# Patient Record
Sex: MALE | Race: WHITE | NOT HISPANIC OR LATINO | Employment: OTHER | URBAN - METROPOLITAN AREA
[De-identification: names, ages, dates, MRNs, and addresses within clinical notes are randomized per-mention and may not be internally consistent; named-entity substitution may affect disease eponyms.]

---

## 2017-09-18 ENCOUNTER — OFFICE VISIT - HEALTHEAST (OUTPATIENT)
Dept: INTERNAL MEDICINE | Facility: CLINIC | Age: 72
End: 2017-09-18

## 2017-09-18 DIAGNOSIS — Z00.00 HEALTH CARE MAINTENANCE: ICD-10-CM

## 2017-09-18 DIAGNOSIS — Z12.5 PROSTATE CANCER SCREENING: ICD-10-CM

## 2017-09-18 LAB
CHOLEST SERPL-MCNC: 225 MG/DL
FASTING STATUS PATIENT QL REPORTED: YES
HDLC SERPL-MCNC: 52 MG/DL
LDLC SERPL CALC-MCNC: 162 MG/DL
PSA SERPL-MCNC: 3 NG/ML (ref 0–6.5)
TRIGL SERPL-MCNC: 53 MG/DL

## 2017-09-18 ASSESSMENT — MIFFLIN-ST. JEOR: SCORE: 1484.93

## 2017-09-21 ENCOUNTER — COMMUNICATION - HEALTHEAST (OUTPATIENT)
Dept: INTERNAL MEDICINE | Facility: CLINIC | Age: 72
End: 2017-09-21

## 2017-10-25 ENCOUNTER — RECORDS - HEALTHEAST (OUTPATIENT)
Dept: ADMINISTRATIVE | Facility: OTHER | Age: 72
End: 2017-10-25

## 2017-11-02 ENCOUNTER — RECORDS - HEALTHEAST (OUTPATIENT)
Dept: ADMINISTRATIVE | Facility: OTHER | Age: 72
End: 2017-11-02

## 2019-01-25 ENCOUNTER — OFFICE VISIT - HEALTHEAST (OUTPATIENT)
Dept: INTERNAL MEDICINE | Facility: CLINIC | Age: 74
End: 2019-01-25

## 2019-01-25 DIAGNOSIS — Z23 FLU VACCINE NEED: ICD-10-CM

## 2019-01-25 DIAGNOSIS — I10 ESSENTIAL HYPERTENSION, BENIGN: ICD-10-CM

## 2019-01-25 DIAGNOSIS — Z12.5 SCREENING FOR PROSTATE CANCER: ICD-10-CM

## 2019-01-25 DIAGNOSIS — E55.9 VITAMIN D DEFICIENCY: ICD-10-CM

## 2019-01-25 DIAGNOSIS — Z00.00 HEALTHCARE MAINTENANCE: ICD-10-CM

## 2019-01-25 DIAGNOSIS — Z12.11 SCREEN FOR COLON CANCER: ICD-10-CM

## 2019-01-25 DIAGNOSIS — E78.00 HYPERCHOLESTEROLEMIA: ICD-10-CM

## 2019-01-25 DIAGNOSIS — Z00.00 MEDICARE ANNUAL WELLNESS VISIT, SUBSEQUENT: ICD-10-CM

## 2019-01-25 DIAGNOSIS — C44.310 BASAL CELL CARCINOMA (BCC) OF SKIN OF FACE, UNSPECIFIED PART OF FACE: ICD-10-CM

## 2019-01-25 ASSESSMENT — MIFFLIN-ST. JEOR: SCORE: 1510.44

## 2019-01-28 ENCOUNTER — AMBULATORY - HEALTHEAST (OUTPATIENT)
Dept: LAB | Facility: CLINIC | Age: 74
End: 2019-01-28

## 2019-01-28 DIAGNOSIS — Z00.00 HEALTHCARE MAINTENANCE: ICD-10-CM

## 2019-01-28 DIAGNOSIS — Z12.5 SCREENING FOR PROSTATE CANCER: ICD-10-CM

## 2019-01-28 DIAGNOSIS — E55.9 VITAMIN D DEFICIENCY: ICD-10-CM

## 2019-01-28 DIAGNOSIS — E78.00 HYPERCHOLESTEROLEMIA: ICD-10-CM

## 2019-01-28 LAB
ALBUMIN SERPL-MCNC: 3.7 G/DL (ref 3.5–5)
ALBUMIN UR-MCNC: NEGATIVE MG/DL
ALP SERPL-CCNC: 46 U/L (ref 45–120)
ALT SERPL W P-5'-P-CCNC: 16 U/L (ref 0–45)
ANION GAP SERPL CALCULATED.3IONS-SCNC: 7 MMOL/L (ref 5–18)
APPEARANCE UR: CLEAR
AST SERPL W P-5'-P-CCNC: 15 U/L (ref 0–40)
BACTERIA #/AREA URNS HPF: NORMAL HPF
BILIRUB SERPL-MCNC: 0.7 MG/DL (ref 0–1)
BILIRUB UR QL STRIP: NEGATIVE
BUN SERPL-MCNC: 17 MG/DL (ref 8–28)
CALCIUM SERPL-MCNC: 8.9 MG/DL (ref 8.5–10.5)
CHLORIDE BLD-SCNC: 106 MMOL/L (ref 98–107)
CHOLEST SERPL-MCNC: 204 MG/DL
CO2 SERPL-SCNC: 27 MMOL/L (ref 22–31)
COLOR UR AUTO: YELLOW
CREAT SERPL-MCNC: 0.79 MG/DL (ref 0.7–1.3)
ERYTHROCYTE [DISTWIDTH] IN BLOOD BY AUTOMATED COUNT: 10.6 % (ref 11–14.5)
FASTING STATUS PATIENT QL REPORTED: YES
GFR SERPL CREATININE-BSD FRML MDRD: >60 ML/MIN/1.73M2
GLUCOSE BLD-MCNC: 93 MG/DL (ref 70–125)
GLUCOSE UR STRIP-MCNC: NEGATIVE MG/DL
HCT VFR BLD AUTO: 43.9 % (ref 40–54)
HDLC SERPL-MCNC: 46 MG/DL
HGB BLD-MCNC: 14.8 G/DL (ref 14–18)
HGB UR QL STRIP: NEGATIVE
KETONES UR STRIP-MCNC: NEGATIVE MG/DL
LDLC SERPL CALC-MCNC: 143 MG/DL
LEUKOCYTE ESTERASE UR QL STRIP: NEGATIVE
MCH RBC QN AUTO: 32 PG (ref 27–34)
MCHC RBC AUTO-ENTMCNC: 33.7 G/DL (ref 32–36)
MCV RBC AUTO: 95 FL (ref 80–100)
NITRATE UR QL: NEGATIVE
PH UR STRIP: 5.5 [PH] (ref 5–8)
PLATELET # BLD AUTO: 190 THOU/UL (ref 140–440)
PMV BLD AUTO: 7.3 FL (ref 7–10)
POTASSIUM BLD-SCNC: 4 MMOL/L (ref 3.5–5)
PROT SERPL-MCNC: 6.4 G/DL (ref 6–8)
PSA SERPL-MCNC: 2.4 NG/ML (ref 0–6.5)
RBC # BLD AUTO: 4.63 MILL/UL (ref 4.4–6.2)
RBC #/AREA URNS AUTO: NORMAL HPF
SODIUM SERPL-SCNC: 140 MMOL/L (ref 136–145)
SP GR UR STRIP: >=1.03 (ref 1–1.03)
SQUAMOUS #/AREA URNS AUTO: NORMAL LPF
TRIGL SERPL-MCNC: 74 MG/DL
UROBILINOGEN UR STRIP-ACNC: NORMAL
WBC #/AREA URNS AUTO: NORMAL HPF
WBC: 4.6 THOU/UL (ref 4–11)

## 2019-01-29 LAB — 25(OH)D3 SERPL-MCNC: 35.6 NG/ML (ref 30–80)

## 2019-02-07 ENCOUNTER — COMMUNICATION - HEALTHEAST (OUTPATIENT)
Dept: INTERNAL MEDICINE | Facility: CLINIC | Age: 74
End: 2019-02-07

## 2019-02-08 ENCOUNTER — COMMUNICATION - HEALTHEAST (OUTPATIENT)
Dept: INTERNAL MEDICINE | Facility: CLINIC | Age: 74
End: 2019-02-08

## 2019-02-08 DIAGNOSIS — I10 ESSENTIAL HYPERTENSION, BENIGN: ICD-10-CM

## 2019-03-11 ENCOUNTER — RECORDS - HEALTHEAST (OUTPATIENT)
Dept: ADMINISTRATIVE | Facility: OTHER | Age: 74
End: 2019-03-11

## 2019-03-13 ENCOUNTER — RECORDS - HEALTHEAST (OUTPATIENT)
Dept: ADMINISTRATIVE | Facility: OTHER | Age: 74
End: 2019-03-13

## 2019-03-14 ENCOUNTER — COMMUNICATION - HEALTHEAST (OUTPATIENT)
Dept: INTERNAL MEDICINE | Facility: CLINIC | Age: 74
End: 2019-03-14

## 2019-03-14 DIAGNOSIS — I10 ESSENTIAL HYPERTENSION, BENIGN: ICD-10-CM

## 2019-04-01 ENCOUNTER — OFFICE VISIT - HEALTHEAST (OUTPATIENT)
Dept: INTERNAL MEDICINE | Facility: CLINIC | Age: 74
End: 2019-04-01

## 2019-04-01 DIAGNOSIS — Z51.81 MEDICATION MONITORING ENCOUNTER: ICD-10-CM

## 2019-04-01 DIAGNOSIS — I10 ESSENTIAL HYPERTENSION: ICD-10-CM

## 2019-04-01 LAB
ANION GAP SERPL CALCULATED.3IONS-SCNC: 7 MMOL/L (ref 5–18)
BUN SERPL-MCNC: 24 MG/DL (ref 8–28)
CALCIUM SERPL-MCNC: 9.1 MG/DL (ref 8.5–10.5)
CHLORIDE BLD-SCNC: 106 MMOL/L (ref 98–107)
CO2 SERPL-SCNC: 27 MMOL/L (ref 22–31)
CREAT SERPL-MCNC: 0.78 MG/DL (ref 0.7–1.3)
GFR SERPL CREATININE-BSD FRML MDRD: >60 ML/MIN/1.73M2
GLUCOSE BLD-MCNC: 90 MG/DL (ref 70–125)
POTASSIUM BLD-SCNC: 4.1 MMOL/L (ref 3.5–5)
SODIUM SERPL-SCNC: 140 MMOL/L (ref 136–145)
URATE SERPL-MCNC: 4.7 MG/DL (ref 3–8)

## 2019-04-01 ASSESSMENT — MIFFLIN-ST. JEOR: SCORE: 1511.13

## 2019-04-02 ENCOUNTER — COMMUNICATION - HEALTHEAST (OUTPATIENT)
Dept: INTERNAL MEDICINE | Facility: CLINIC | Age: 74
End: 2019-04-02

## 2019-04-09 ENCOUNTER — COMMUNICATION - HEALTHEAST (OUTPATIENT)
Dept: INTERNAL MEDICINE | Facility: CLINIC | Age: 74
End: 2019-04-09

## 2019-04-10 ENCOUNTER — COMMUNICATION - HEALTHEAST (OUTPATIENT)
Dept: INTERNAL MEDICINE | Facility: CLINIC | Age: 74
End: 2019-04-10

## 2019-04-12 ENCOUNTER — OFFICE VISIT - HEALTHEAST (OUTPATIENT)
Dept: INTERNAL MEDICINE | Facility: CLINIC | Age: 74
End: 2019-04-12

## 2019-04-12 DIAGNOSIS — H61.21 IMPACTED CERUMEN OF RIGHT EAR: ICD-10-CM

## 2019-04-12 DIAGNOSIS — I10 ESSENTIAL HYPERTENSION, BENIGN: ICD-10-CM

## 2019-04-12 ASSESSMENT — MIFFLIN-ST. JEOR: SCORE: 1515.1

## 2019-09-10 ENCOUNTER — RECORDS - HEALTHEAST (OUTPATIENT)
Dept: ADMINISTRATIVE | Facility: OTHER | Age: 74
End: 2019-09-10

## 2019-12-12 ENCOUNTER — COMMUNICATION - HEALTHEAST (OUTPATIENT)
Dept: INTERNAL MEDICINE | Facility: CLINIC | Age: 74
End: 2019-12-12

## 2019-12-13 ENCOUNTER — COMMUNICATION - HEALTHEAST (OUTPATIENT)
Dept: INTERNAL MEDICINE | Facility: CLINIC | Age: 74
End: 2019-12-13

## 2019-12-13 DIAGNOSIS — I10 ESSENTIAL HYPERTENSION, BENIGN: ICD-10-CM

## 2020-01-27 ENCOUNTER — OFFICE VISIT - HEALTHEAST (OUTPATIENT)
Dept: INTERNAL MEDICINE | Facility: CLINIC | Age: 75
End: 2020-01-27

## 2020-01-27 DIAGNOSIS — E78.00 HYPERCHOLESTEROLEMIA: ICD-10-CM

## 2020-01-27 DIAGNOSIS — Z12.5 SCREENING FOR PROSTATE CANCER: ICD-10-CM

## 2020-01-27 DIAGNOSIS — I10 ESSENTIAL HYPERTENSION, BENIGN: ICD-10-CM

## 2020-01-27 DIAGNOSIS — Z11.59 NEED FOR HEPATITIS C SCREENING TEST: ICD-10-CM

## 2020-01-27 DIAGNOSIS — Z23 FLU VACCINE NEED: ICD-10-CM

## 2020-01-27 LAB
ALBUMIN SERPL-MCNC: 3.8 G/DL (ref 3.5–5)
ALP SERPL-CCNC: 50 U/L (ref 45–120)
ALT SERPL W P-5'-P-CCNC: 14 U/L (ref 0–45)
ANION GAP SERPL CALCULATED.3IONS-SCNC: 6 MMOL/L (ref 5–18)
AST SERPL W P-5'-P-CCNC: 13 U/L (ref 0–40)
BILIRUB SERPL-MCNC: 0.3 MG/DL (ref 0–1)
BUN SERPL-MCNC: 22 MG/DL (ref 8–28)
CALCIUM SERPL-MCNC: 9.1 MG/DL (ref 8.5–10.5)
CHLORIDE BLD-SCNC: 103 MMOL/L (ref 98–107)
CHOLEST SERPL-MCNC: 206 MG/DL
CO2 SERPL-SCNC: 31 MMOL/L (ref 22–31)
CREAT SERPL-MCNC: 0.78 MG/DL (ref 0.7–1.3)
ERYTHROCYTE [DISTWIDTH] IN BLOOD BY AUTOMATED COUNT: 9.9 % (ref 11–14.5)
FASTING STATUS PATIENT QL REPORTED: NO
GFR SERPL CREATININE-BSD FRML MDRD: >60 ML/MIN/1.73M2
GLUCOSE BLD-MCNC: 92 MG/DL (ref 70–125)
HCT VFR BLD AUTO: 41.4 % (ref 40–54)
HDLC SERPL-MCNC: 44 MG/DL
HGB BLD-MCNC: 14.4 G/DL (ref 14–18)
LDLC SERPL CALC-MCNC: 127 MG/DL
MCH RBC QN AUTO: 34.4 PG (ref 27–34)
MCHC RBC AUTO-ENTMCNC: 34.9 G/DL (ref 32–36)
MCV RBC AUTO: 99 FL (ref 80–100)
PLATELET # BLD AUTO: 231 THOU/UL (ref 140–440)
PMV BLD AUTO: 7.3 FL (ref 7–10)
POTASSIUM BLD-SCNC: 4.3 MMOL/L (ref 3.5–5)
PROT SERPL-MCNC: 6.7 G/DL (ref 6–8)
PSA SERPL-MCNC: 2.2 NG/ML (ref 0–6.5)
RBC # BLD AUTO: 4.2 MILL/UL (ref 4.4–6.2)
SODIUM SERPL-SCNC: 140 MMOL/L (ref 136–145)
TRIGL SERPL-MCNC: 173 MG/DL
WBC: 6.9 THOU/UL (ref 4–11)

## 2020-01-27 ASSESSMENT — MIFFLIN-ST. JEOR: SCORE: 1506.03

## 2020-01-28 LAB — HCV AB SERPL QL IA: NEGATIVE

## 2020-02-24 ENCOUNTER — COMMUNICATION - HEALTHEAST (OUTPATIENT)
Dept: INTERNAL MEDICINE | Facility: CLINIC | Age: 75
End: 2020-02-24

## 2020-02-25 ENCOUNTER — COMMUNICATION - HEALTHEAST (OUTPATIENT)
Dept: SCHEDULING | Facility: CLINIC | Age: 75
End: 2020-02-25

## 2020-02-26 ENCOUNTER — OFFICE VISIT - HEALTHEAST (OUTPATIENT)
Dept: INTERNAL MEDICINE | Facility: CLINIC | Age: 75
End: 2020-02-26

## 2020-02-26 DIAGNOSIS — J02.9 SORE THROAT: ICD-10-CM

## 2020-02-26 DIAGNOSIS — J34.89 RHINORRHEA: ICD-10-CM

## 2020-02-26 LAB — DEPRECATED S PYO AG THROAT QL EIA: NORMAL

## 2020-02-26 ASSESSMENT — MIFFLIN-ST. JEOR: SCORE: 1516.8

## 2020-02-27 LAB — GROUP A STREP BY PCR: NORMAL

## 2020-03-03 ENCOUNTER — RECORDS - HEALTHEAST (OUTPATIENT)
Dept: ADMINISTRATIVE | Facility: OTHER | Age: 75
End: 2020-03-03

## 2020-08-30 ENCOUNTER — COMMUNICATION - HEALTHEAST (OUTPATIENT)
Dept: INTERNAL MEDICINE | Facility: CLINIC | Age: 75
End: 2020-08-30

## 2020-09-27 ENCOUNTER — COMMUNICATION - HEALTHEAST (OUTPATIENT)
Dept: INTERNAL MEDICINE | Facility: CLINIC | Age: 75
End: 2020-09-27

## 2020-10-21 ENCOUNTER — COMMUNICATION - HEALTHEAST (OUTPATIENT)
Dept: INTERNAL MEDICINE | Facility: CLINIC | Age: 75
End: 2020-10-21

## 2020-11-02 ENCOUNTER — OFFICE VISIT - HEALTHEAST (OUTPATIENT)
Dept: INTERNAL MEDICINE | Facility: CLINIC | Age: 75
End: 2020-11-02

## 2020-11-02 DIAGNOSIS — E78.00 HYPERCHOLESTEROLEMIA: ICD-10-CM

## 2020-11-02 DIAGNOSIS — Z12.5 SCREENING FOR PROSTATE CANCER: ICD-10-CM

## 2020-11-02 DIAGNOSIS — Z00.00 MEDICARE ANNUAL WELLNESS VISIT, SUBSEQUENT: ICD-10-CM

## 2020-11-02 DIAGNOSIS — Z23 NEED FOR PNEUMOCOCCAL VACCINATION: ICD-10-CM

## 2020-11-02 DIAGNOSIS — Z51.81 ENCOUNTER FOR THERAPEUTIC DRUG MONITORING: ICD-10-CM

## 2020-11-02 DIAGNOSIS — I10 ESSENTIAL HYPERTENSION: ICD-10-CM

## 2020-11-02 LAB
ALBUMIN SERPL-MCNC: 4 G/DL (ref 3.5–5)
ALBUMIN UR-MCNC: NEGATIVE MG/DL
ALP SERPL-CCNC: 50 U/L (ref 45–120)
ALT SERPL W P-5'-P-CCNC: 15 U/L (ref 0–45)
ANION GAP SERPL CALCULATED.3IONS-SCNC: 9 MMOL/L (ref 5–18)
APPEARANCE UR: CLEAR
AST SERPL W P-5'-P-CCNC: 14 U/L (ref 0–40)
BACTERIA #/AREA URNS HPF: ABNORMAL HPF
BILIRUB SERPL-MCNC: 0.8 MG/DL (ref 0–1)
BILIRUB UR QL STRIP: NEGATIVE
BUN SERPL-MCNC: 15 MG/DL (ref 8–28)
CALCIUM SERPL-MCNC: 9 MG/DL (ref 8.5–10.5)
CHLORIDE BLD-SCNC: 105 MMOL/L (ref 98–107)
CHOLEST SERPL-MCNC: 201 MG/DL
CO2 SERPL-SCNC: 26 MMOL/L (ref 22–31)
COLOR UR AUTO: YELLOW
CREAT SERPL-MCNC: 0.77 MG/DL (ref 0.7–1.3)
ERYTHROCYTE [DISTWIDTH] IN BLOOD BY AUTOMATED COUNT: 11.2 % (ref 11–14.5)
FASTING STATUS PATIENT QL REPORTED: YES
GFR SERPL CREATININE-BSD FRML MDRD: >60 ML/MIN/1.73M2
GLUCOSE BLD-MCNC: 98 MG/DL (ref 70–125)
GLUCOSE UR STRIP-MCNC: NEGATIVE MG/DL
HCT VFR BLD AUTO: 45.3 % (ref 40–54)
HDLC SERPL-MCNC: 53 MG/DL
HGB BLD-MCNC: 15.3 G/DL (ref 14–18)
HGB UR QL STRIP: NEGATIVE
KETONES UR STRIP-MCNC: NEGATIVE MG/DL
LDLC SERPL CALC-MCNC: 138 MG/DL
LEUKOCYTE ESTERASE UR QL STRIP: NEGATIVE
MCH RBC QN AUTO: 33.1 PG (ref 27–34)
MCHC RBC AUTO-ENTMCNC: 33.7 G/DL (ref 32–36)
MCV RBC AUTO: 98 FL (ref 80–100)
NITRATE UR QL: NEGATIVE
PH UR STRIP: 5.5 [PH] (ref 5–8)
PLATELET # BLD AUTO: 227 THOU/UL (ref 140–440)
PMV BLD AUTO: 7.3 FL (ref 7–10)
POTASSIUM BLD-SCNC: 4.4 MMOL/L (ref 3.5–5)
PROT SERPL-MCNC: 6.6 G/DL (ref 6–8)
PSA SERPL-MCNC: 2 NG/ML (ref 0–6.5)
RBC # BLD AUTO: 4.61 MILL/UL (ref 4.4–6.2)
RBC #/AREA URNS AUTO: ABNORMAL HPF
SODIUM SERPL-SCNC: 140 MMOL/L (ref 136–145)
SP GR UR STRIP: >=1.03 (ref 1–1.03)
SQUAMOUS #/AREA URNS AUTO: ABNORMAL LPF
TRIGL SERPL-MCNC: 50 MG/DL
URATE SERPL-MCNC: 3.5 MG/DL (ref 3–8)
UROBILINOGEN UR STRIP-ACNC: ABNORMAL
WBC #/AREA URNS AUTO: ABNORMAL HPF
WBC: 4.2 THOU/UL (ref 4–11)

## 2020-11-02 ASSESSMENT — MIFFLIN-ST. JEOR: SCORE: 1509.43

## 2021-03-03 ENCOUNTER — COMMUNICATION - HEALTHEAST (OUTPATIENT)
Dept: INTERNAL MEDICINE | Facility: CLINIC | Age: 76
End: 2021-03-03

## 2021-03-03 DIAGNOSIS — I10 ESSENTIAL HYPERTENSION, BENIGN: ICD-10-CM

## 2021-03-04 RX ORDER — LOSARTAN POTASSIUM 100 MG/1
TABLET ORAL
Qty: 90 TABLET | Refills: 2 | Status: SHIPPED | OUTPATIENT
Start: 2021-03-04 | End: 2021-12-09

## 2021-05-27 NOTE — PATIENT INSTRUCTIONS - HE
1.  Continue current medications    2.  Report BP readings in two weeks    3. See for yearly exam or as needed

## 2021-05-27 NOTE — PROGRESS NOTES
ASSESSMENT:  1. Impacted cerumen of right ear  Right ear is occluded with cerumen.  This will be irrigated for cerumen removal  - Ear cerumen removal; Future    2. Essential hypertension, benign  He is treated with lisinopril 40 mg daily and HCTZ 12.5 mg daily.  But he does have some lability of blood pressure, but on average blood pressure is in the desired range.  He will continue the current regimen  - hydroCHLOROthiazide (MICROZIDE) 12.5 mg capsule; Take 1 capsule (12.5 mg total) by mouth daily.  Dispense: 90 capsule; Refill: 3    3.  History of skin cancers with numerous actinic keratosis  He had recent cryotherapy by Dr. Enrique Miller    PLAN:  Patient Instructions   1.  Irrigate right ear for cerumen    2.  Continue lisinopril and HCTZ    3.  See for yearly exam in January or as needed      Orders Placed This Encounter   Procedures     Ear cerumen removal     Standing Status:   Future     Standing Expiration Date:   4/11/2020     Medications Discontinued During This Encounter   Medication Reason     hydroCHLOROthiazide (MICROZIDE) 12.5 mg capsule        Return in about 9 months (around 1/12/2020) for Annual physical.      ASSESSED PROBLEMS:  1. Impacted cerumen of right ear  Ear cerumen removal   2. Essential hypertension, benign  hydroCHLOROthiazide (MICROZIDE) 12.5 mg capsule       CHIEF COMPLAINT:  Chief Complaint   Patient presents with     Ear Fullness     RT ear plugged-- last few weeks.        HISTORY OF PRESENT ILLNESS:  Gasper is a 74 y.o. male presenting to the clinic today for medication monitoring with HTN and cerumen impaction.    HTN: 130/78 in office today. At home he had a couple high ones while his brother who was staying with him for 8 days was around. Otherwise he thinks they were good overall readings at home. He gets some dizziness with standing after doing an exercise. He also gets a little bit of a cough/tickle in his throat, but that does not bother him. 130/68 on recheck and slightly  "higher with standing. He has been tolerating HCTZ well.     Ear congestion: He started feeling pressure in his ears at the end of March, and since then he has started having plugged ears. Initially it was intermittently plugged up and now it feels permanently plugged. The right more so than the left.     Toe injury: He smashed his toe on the kitchen floor and then it slowly bruised, and it took forever for the bruising to clear, but the nail itself has a long way to go with it's healing.     Derm: After Dr. Miller dermatologist sees him he is not sure there's much open skin left because he needs so many spots treated. He has been doing annual follow up but is considering if he should do twice a year. He has a seborrheic keratosis on his forehead that bother's him. He picks at it, and sometimes it bleeds.     REVIEW OF SYSTEMS:   Denies weakness, fatigue, medication side effects, malaise  Admits to slight tingling in throat with cough, ears plugged up, bothersome itching spot on forehead,   All other systems are negative.    PFSH:  He recently had his brother stay with him for 8 days. His brother's memory is not very good and he was a source of stress for the patient. His brother is moving into minimal assisted living soon, and he is selling his brother's house for him.   He did some golfing in [place] but no local golfing yet this year.   Reviewed as below.     TOBACCO USE:  Social History     Tobacco Use   Smoking Status Never Smoker   Smokeless Tobacco Never Used       VITALS:  Vitals:    04/12/19 1312 04/12/19 1340 04/12/19 1341   BP: 130/78 130/68 144/80   Patient Site: Left Arm     Patient Position: Sitting  Standing   Cuff Size: Adult Regular     Pulse: 62     Resp: 16     SpO2: 97%     Weight: 173 lb (78.5 kg)     Height: 5' 9\" (1.753 m)       Wt Readings from Last 3 Encounters:   04/12/19 173 lb (78.5 kg)   04/01/19 172 lb 2 oz (78.1 kg)   01/25/19 171 lb 1.6 oz (77.6 kg)     Body mass index is 25.55 " kg/m .    PHYSICAL EXAM:  Constitutional:   Reveals an alert pleasant talkative man, affect appropriate, does not appear acutely ill 130/68 on recheck and 144/80 on standing. Vitals: per nursing notes.  HEENT:  Ears: right external canal occluded with cerumen, left minimal cerumen TM normal   Neck:  Supple, no carotid bruits or adenopathy.  Back:  No spine or CVA pain.  Thorax:  No bony deformities.  Lungs: Clear to A&P without rales or wheezes.  Respiratory effort normal.  Cardiac:   Regular rate and rhythm, normal S1, S2, no murmur or gallop.  Skin: numerous actinic keratosis on forearm, scarring on face, and seborrheic keratosis on right temple. No jaundice, peripheral cyanosis or lesions to suggest malignancy.  Neuro:  Alert and oriented. No gross focal deficits.  Psychiatric:  Memory intact, mood appropriate.    QUALITY MEASURES:  The following high BMI interventions were performed this visit: encouragement to exercise    ADDITIONAL HISTORY SUMMARIZED (2): 4/9/19, 4/2/19 patient e-mail and telephone encounters reviewed showing patient having hearing impairment.   DECISION TO OBTAIN EXTRA INFORMATION (1): None.   RADIOLOGY TESTS (1): None.  LABS (1): 1/28/19, 4/1/19 labs reviewed  MEDICINE TESTS (1): None.  INDEPENDENT REVIEW (2 each): None.     The visit lasted a total of 21 minutes face to face with the patient. Over 50% of the time was spent counseling and educating the patient about cerumen impaction and HTN.    IRaza, am scribing for and in the presence of, Dr. Schrader.    IChilango, personally performed the services described in this documentation, as scribed by Raza Post in my presence, and it is both accurate and complete.    Dragon dictation was used for this note.  Speech recognition errors are a possibility.    MEDICATIONS:  Current Outpatient Medications   Medication Sig Dispense Refill     hydroCHLOROthiazide (MICROZIDE) 12.5 mg capsule Take 1 capsule (12.5 mg total) by mouth  daily. 90 capsule 3     lisinopril (PRINIVIL,ZESTRIL) 40 MG tablet Take 1 tablet (40 mg total) by mouth daily. 90 tablet 3     fluticasone (FLONASE) 50 mcg/actuation nasal spray 2 sprays into each nostril daily. 10 g 11     naproxen sodium (ALEVE) 220 MG tablet Take 220 mg by mouth every 12 (twelve) hours as needed for pain.       No current facility-administered medications for this visit.        Total data points: 3

## 2021-05-27 NOTE — TELEPHONE ENCOUNTER
----- Message from Chilango Schrader MD sent at 4/2/2019  7:36 AM CDT -----  Gasper: The uric acid is in the normal range at 4.7.  Your potassium, kidney tests, and other labs all are normal.  It was nice to see you.  Let me know your follow-up with blood pressure readings in 2 weeks      Chilango Schrader MD

## 2021-05-27 NOTE — PROGRESS NOTES
ASSESSMENT:  1. Essential hypertension  Toprol was increased from 20-40 mg recently by Dr. Elizalde due to significant elevations in home blood pressure readings.  Blood pressure today is good, but has been high on most home readings.  He will report readings in the several weeks.  If blood pressure readings is high on average, I would favor adding amlodipine.  Gasper has noted a minor cough.  He was advised to monitor this in the future since it could be related to the ACE inhibitor  - Basic Metabolic Panel    2. Medication monitoring encounter  Monitoring labs will be checked  - Basic Metabolic Panel  - Uric Acid    3.  Actinic keratoses with  history of skin cancer  Regular dermatology follow-up was advised.  Given the extent of his actinic keratoses, he may wish to consider blue light therapy in the future    PLAN:  Patient Instructions   1.  Continue current medications    2.  Report BP readings in two weeks    3. See for yearly exam or as needed      Orders Placed This Encounter   Procedures     Basic Metabolic Panel     Uric Acid     There are no discontinued medications.    Return in about 1 year (around 4/1/2020) for Annual physical.      ASSESSED PROBLEMS:  1. Essential hypertension  Basic Metabolic Panel   2. Medication monitoring encounter  Basic Metabolic Panel    Uric Acid       CHIEF COMPLAINT:  Chief Complaint   Patient presents with     Follow-up     blood pressure       HISTORY OF PRESENT ILLNESS:  Gasper is a 74 y.o. male presenting to the clinic today for medication monitoring with HTN, URI, actinic keratosis, and hearing impairment. He is accompanied by his wife.     HTN: His blood pressure was high right up until this am when it was 112. Prior to today it was just a little high. He gets dizzy on the medication, and his right ear gets plugged up. He is on lisinopril. 130/72 on recheck in office today. He does notice some nocturia, no extra urination during the day.     URI: His right ear gets  "plugged, and he is starting to get a cough. His wife has bronchitis and he hopes he is not catching what she has. He also has a little ringing in the ears.     Derm He has multiple actinic keratosis, he thinks about 20. He recently went to dermatologist and had a bunch of them frozen, and this is very unpleasant. He would like to look into going to a different derm group and having blue light therapy or something else.     Hearing impairment: He is having hearing impairment and interested in going with the [employer] hearing aids.     REVIEW OF SYSTEMS:   Denies dyspnea, /GI symptoms,   Admits to some tinnitus, hearing imparement, cough, dizziness  Admits to tinnitus, right ear plugging up, dizziness, nocturia  All other systems are negative.    PFSH:  He just got back from [employer] recently.   He likes to golf.   He will be hosting his brother for a little while in the near future.   Reviewed as below.     TOBACCO USE:  Social History     Tobacco Use   Smoking Status Never Smoker   Smokeless Tobacco Never Used       VITALS:  Vitals:    04/01/19 1500 04/01/19 1517   BP: 106/72 130/72   Patient Site: Left Arm    Patient Position: Sitting    Cuff Size: Adult Regular    Pulse: 64    SpO2: 97%    Weight: 172 lb 2 oz (78.1 kg)    Height: 5' 9\" (1.753 m)      Wt Readings from Last 3 Encounters:   04/01/19 172 lb 2 oz (78.1 kg)   01/25/19 171 lb 1.6 oz (77.6 kg)   09/18/17 164 lb 9.6 oz (74.7 kg)     Body mass index is 25.42 kg/m .    PHYSICAL EXAM:  Constitutional:   Reveals an alert pleasant talkative man, affect appropriate, does not appear acutely ill.  Vitals: per nursing notes.  HEENT: atraumatic   Neck:  Supple, no carotid bruits or adenopathy.  Back:  No spine or CVA pain.  Thorax:  No bony deformities.  Lungs: Clear to A&P without rales or wheezes.  Respiratory effort normal.  Cardiac:   Regular rate and rhythm, normal S1, S2, no murmur or gallop.  Extremities:   No peripheral edema,   Skin: numerous actinic " keratosis on forearms and face.  No jaundice, peripheral cyanosis or lesions to suggest malignancy.  Neuro:  Alert and oriented.  No gross focal deficits.  Psychiatric:  Memory intact, mood appropriate.    QUALITY MEASURES:  The following high BMI interventions were performed this visit: encouragement to exercise    ADDITIONAL HISTORY SUMMARIZED (2): 3/13/19 Colonoscopy reviewed for clearance.   DECISION TO OBTAIN EXTRA INFORMATION (1): None.   RADIOLOGY TESTS (1): None.  LABS (1): 1/28/19 labs reviewed and ordered labs today.   MEDICINE TESTS (1): None.  INDEPENDENT REVIEW (2 each): None.     The visit lasted a total of 19 minutes face to face with the patient. Over 50% of the time was spent counseling and educating the patient about current HTN, URI, actinic keratosis, and hearing impairment.    I, Raza Post, am scribing for and in the presence of, Dr. Shcrader.    I, Chilango Schrader, personally performed the services described in this documentation, as scribed by Raza Post in my presence, and it is both accurate and complete.    Dragon dictation was used for this note.  Speech recognition errors are a possibility.    MEDICATIONS:  Current Outpatient Medications   Medication Sig Dispense Refill     fluticasone (FLONASE) 50 mcg/actuation nasal spray 2 sprays into each nostril daily. 10 g 11     hydroCHLOROthiazide (MICROZIDE) 12.5 mg capsule Take 1 capsule (12.5 mg total) by mouth daily. 30 capsule 11     lisinopril (PRINIVIL,ZESTRIL) 40 MG tablet Take 1 tablet (40 mg total) by mouth daily. 90 tablet 3     naproxen sodium (ALEVE) 220 MG tablet Take 220 mg by mouth every 12 (twelve) hours as needed for pain.       No current facility-administered medications for this visit.        Total data points: 3

## 2021-05-27 NOTE — PATIENT INSTRUCTIONS - HE
1.  Irrigate right ear for cerumen    2.  Continue lisinopril and HCTZ    3.  See for yearly exam in January or as needed

## 2021-05-27 NOTE — TELEPHONE ENCOUNTER
Patient Returning Call  Reason for call:  Patient confirmed 1:20pm will work on 4/12/19  Information relayed to patient: Patient was informed of new appointment time on 1:20  Patient has additional questions:  No  If YES, what are your questions/concerns:  n/a  Okay to leave a detailed message?: No call back needed

## 2021-05-30 ENCOUNTER — RECORDS - HEALTHEAST (OUTPATIENT)
Dept: ADMINISTRATIVE | Facility: CLINIC | Age: 76
End: 2021-05-30

## 2021-05-31 VITALS — WEIGHT: 164.6 LBS | HEIGHT: 70 IN | BODY MASS INDEX: 23.56 KG/M2

## 2021-06-02 ENCOUNTER — RECORDS - HEALTHEAST (OUTPATIENT)
Dept: ADMINISTRATIVE | Facility: CLINIC | Age: 76
End: 2021-06-02

## 2021-06-02 VITALS — BODY MASS INDEX: 25.34 KG/M2 | HEIGHT: 69 IN | WEIGHT: 171.1 LBS

## 2021-06-02 VITALS — HEIGHT: 69 IN | WEIGHT: 172.13 LBS | BODY MASS INDEX: 25.5 KG/M2

## 2021-06-03 VITALS — WEIGHT: 173 LBS | HEIGHT: 69 IN | BODY MASS INDEX: 25.62 KG/M2

## 2021-06-04 VITALS
BODY MASS INDEX: 25.68 KG/M2 | DIASTOLIC BLOOD PRESSURE: 72 MMHG | HEART RATE: 62 BPM | OXYGEN SATURATION: 98 % | HEIGHT: 69 IN | WEIGHT: 173.38 LBS | SYSTOLIC BLOOD PRESSURE: 128 MMHG

## 2021-06-04 VITALS
WEIGHT: 171 LBS | HEART RATE: 58 BPM | SYSTOLIC BLOOD PRESSURE: 126 MMHG | HEIGHT: 69 IN | BODY MASS INDEX: 25.33 KG/M2 | DIASTOLIC BLOOD PRESSURE: 70 MMHG | OXYGEN SATURATION: 98 %

## 2021-06-04 NOTE — TELEPHONE ENCOUNTER
Patient Returning Call  Reason for call:  Returning clinic call.  Information relayed to patient:  This message and the message from 11/13/19 are one in the same.  The patient states has picked up the new prescription and that he already feels better, the patient had not made the follow-up appointment as recommend, the patient was agreeable to scheduling at this time.  Transferred caller to scheduling at end of this call.  Patient has additional questions:  No  If YES, what are your questions/concerns:  NA  Okay to leave a detailed message?: No call back needed

## 2021-06-04 NOTE — TELEPHONE ENCOUNTER
Gasper called complaining of a cough on lisinopril.  He will stop lisinopril and switch to losartan 100 mg daily.  He should see me in several weeks for blood pressure recheck on the new medication.

## 2021-06-05 VITALS
HEIGHT: 70 IN | BODY MASS INDEX: 24.09 KG/M2 | DIASTOLIC BLOOD PRESSURE: 74 MMHG | SYSTOLIC BLOOD PRESSURE: 136 MMHG | OXYGEN SATURATION: 98 % | WEIGHT: 168.25 LBS | HEART RATE: 54 BPM

## 2021-06-05 NOTE — PROGRESS NOTES
ASSESSMENT:  1. Need for hepatitis C screening test  Screening for hepatitis C is done per recent health maintenance recommendations  - Hepatitis C Antibody (Anti-HCV)    2. Essential hypertension, benign  Cough he noted while on lisinopril has resolved.  Blood pressure appears good on the combination of losartan and HCTZ this will be continued  - hydroCHLOROthiazide (MICROZIDE) 12.5 mg capsule; Take 1 capsule (12.5 mg total) by mouth daily.  Dispense: 90 capsule; Refill: 3  - Comprehensive Metabolic Panel  - HM2(CBC w/o Differential)  - losartan (COZAAR) 100 MG tablet; Take 1 tablet (100 mg total) by mouth daily.  Dispense: 90 tablet; Refill: 3    3. Screening for prostate cancer  Yearly PSA will be checked  - PSA (Prostatic-Specific Antigen), Annual Screen    4. Hypercholesterolemia  He has had a mild elevation in cholesterol, but no known coronary artery disease.  A nonfasting lipid cascade will be checked  - Lipid Cascade    5. Flu vaccine need  Influenza vaccine is advised.  He was encouraged to check insurance for Shingrix  - Influenza High Dose,Seasonal,PF 65+ Yrs    6.  History of colonic polyps  Colonoscopy last year showed no polyps.  He would be due for a follow-up in 4 years.  His overall health status should be assessed prior to this since he will be over 77.    PLAN:  Patient Instructions   1.  Shingrix advised.  Check insurance    2.  Flu shot today    3.  Colonoscopy.  Assess need for follow-up in four years.    4.  Continue losartan and HCTZ    5.  See in one year or as needed    6.  Labs as outlined.  He will be notified of test results.    Orders Placed This Encounter   Procedures     Influenza High Dose,Seasonal,PF 65+ Yrs     Hepatitis C Antibody (Anti-HCV)     PSA (Prostatic-Specific Antigen), Annual Screen     Lipid Cascade     Order Specific Question:   Fasting is required?     Answer:   Unknown     Comprehensive Metabolic Panel     HM2(CBC w/o Differential)     Medications Discontinued  During This Encounter   Medication Reason     hydroCHLOROthiazide (MICROZIDE) 12.5 mg capsule Reorder     losartan (COZAAR) 100 MG tablet Reorder       Return in about 1 year (around 1/27/2021) for Annual physical.      ASSESSED PROBLEMS:  1. Need for hepatitis C screening test  Hepatitis C Antibody (Anti-HCV)   2. Essential hypertension, benign  hydroCHLOROthiazide (MICROZIDE) 12.5 mg capsule    Comprehensive Metabolic Panel    HM2(CBC w/o Differential)    losartan (COZAAR) 100 MG tablet   3. Screening for prostate cancer  PSA (Prostatic-Specific Antigen), Annual Screen   4. Hypercholesterolemia  Lipid Cascade   5. Flu vaccine need  Influenza High Dose,Seasonal,PF 65+ Yrs       CHIEF COMPLAINT:  Chief Complaint   Patient presents with     Medication Management     BP med     Immunizations     flu shot       HISTORY OF PRESENT ILLNESS:  Gasper is a 74 y.o. male presenting to the clinic today for hypertension medication management and a flu shot. At his last encounter Gasper was on Losartan but began to have  Cough and on 12/13 he switched to Lisinopril. He notes on this mediation his cough has resolved and his readings remain stable. Gasper tracts his blood pressures at home but believes the cuff he has runs a little bit higher then the office. He confirms readings in a stable range.     Gasper has been dealing with his brother battling dementia and notes it to be a stress factor in his life. He states that his bother has moved from transitional group care to living with his long term girlfriends daughter. She offered to take him in for a year and is trying to look for a better care home. Gasper notes his brother to be a very active and communicative dementia case so physicians want him to have further testing done for proper diagnoses.      Health Maintenance: Shingrix discussed, Flu shot today.   REVIEW OF SYSTEMS:   All other systems are negative.  He does have regular dermatology follow-up due to a history of skin cancers  "and extensive actinic keratosis.  His last colonoscopy was in 2019 and was reviewed.    PFS:  Traveling to [place] soon. He complains that [employer] flights are incredibly expensive for one person. He states if he was coming from anywhere else they would not be like this and [place] is screwing him.  TOBACCO USE:  Social History     Tobacco Use   Smoking Status Never Smoker   Smokeless Tobacco Never Used       VITALS:  Vitals:    01/27/20 1503   BP: 126/70   Patient Site: Left Arm   Patient Position: Sitting   Cuff Size: Adult Regular   Pulse: (!) 58   SpO2: 98%   Weight: 171 lb (77.6 kg)   Height: 5' 9\" (1.753 m)     Wt Readings from Last 3 Encounters:   01/27/20 171 lb (77.6 kg)   04/12/19 173 lb (78.5 kg)   04/01/19 172 lb 2 oz (78.1 kg)     Body mass index is 25.25 kg/m .    PHYSICAL EXAM:  Constitutional:   Reveals a pleasant, talkative male. Affect appropriate.    Vitals: per nursing notes. Repeat BP: 130/62   Neck:  Supple, no carotid bruits or adenopathy.  Back:  No spine or CVA pain.  Thorax:  No bony deformities.  Lungs: Clear to A&P without rales or wheezes.  Respiratory effort normal.  Cardiac:   Regular rate and rhythm, normal S1, S2, no murmur or gallop.  Breasts:   No masses, no axillary adenopathy.  Abdomen:  Soft, active bowel sounds without bruits, mass, or tenderness.  Extremities:   No peripheral edema, pulses in the feet intact.    Skin: Numerous actinic keratosis on face. No lesions to suggest malignancy.  Neuro:  Alert and oriented.  No gross focal deficits. Detailed exam not done.   Psychiatric:  Memory intact, mood appropriate.        MEDICATIONS:  Current Outpatient Medications   Medication Sig Dispense Refill     fluticasone (FLONASE) 50 mcg/actuation nasal spray 2 sprays into each nostril daily. 10 g 11     hydroCHLOROthiazide (MICROZIDE) 12.5 mg capsule Take 1 capsule (12.5 mg total) by mouth daily. 90 capsule 3     losartan (COZAAR) 100 MG tablet Take 1 tablet (100 mg total) by " mouth daily. 90 tablet 3     naproxen sodium (ALEVE) 220 MG tablet Take 220 mg by mouth every 12 (twelve) hours as needed for pain.       No current facility-administered medications for this visit.        ADDITIONAL HISTORY SUMMARIZED (2): Reviewed 3/3/2019 Colonoscopy report.  DECISION TO OBTAIN EXTRA INFORMATION (1): None.   RADIOLOGY TESTS (1): None.  LABS (1): Reviewed and ordered  MEDICINE TESTS (1): None.  INDEPENDENT REVIEW (2 each): None.     The visit lasted a total of 19 minutes face to face with the patient. Over 50% of the time was spent counseling and educating the patient about hypertension management.    IShannan, am scribing for and in the presence of, Dr. Schrader.    IChilango, personally performed the services described in this documentation, as scribed by Shannan Cole in my presence, and it is both accurate and complete.    Dragon dictation was used for this note.  Speech recognition errors are a possibility.      Total data points: 3

## 2021-06-05 NOTE — PATIENT INSTRUCTIONS - HE
1.  Shingrix advised.  Check insurance    2.  Flu shot today    3.  Colonoscopy.  Assess need for follow-up in four years.    4.  Continue losartan and HCTZ    5.  See in one year or as needed

## 2021-06-06 NOTE — PROGRESS NOTES
[facility] Note    Gasper Andrews   75 y.o. male    Date of Visit: 2/26/2020  Chief Complaint   Patient presents with     Cough     Sore Throat     ASSESSMENT/PLAN  1. Sore throat  Rapid Strep A Screen-Throat    Group A Strep, RNA Direct Detection, Throat   2. Rhinorrhea  ipratropium (ATROVENT) 0.03 % nasal spray     ---------------------------------------------    1-2. Suspect dry cough and irritated throat due to post-nasal drainage. Rapid strep negative. Provided patient with Ipratropium nasal spray to help with the rhinorrhea. Would consider switching his Losartan to a CCB if his cough fails to improve with the spray, as he did have dry cough on Lisinopril.     Return in about 3 months (around 5/26/2020) for Recheck.      SUBJECTIVE    Gasper Andrews is a 75 y.o. male non-smoker with a hx of HTN who presents for evaluation of dry cough and itchy throat that began 3 weeks ago. Reports associated rhinorrhea but denies fever, productive cough, reflux symptoms, seasonal allergies. Denies recent travel or known exposure to ill persons. Wonders if the dry air in his house could be contributing.     ROS:   Per HPI, all other systems negative     Medications, allergies, and problem list were reviewed and updated    Patient Active Problem List   Diagnosis     Osteopenia     Benign Adenomatous Polyp Of The Large Intestine     Hypercholesterolemia     Actinic Keratosis     Basal Cell Carcinoma Of The Skin     No past medical history on file.  Current Outpatient Medications   Medication Sig Dispense Refill     fluticasone (FLONASE) 50 mcg/actuation nasal spray 2 sprays into each nostril daily. 10 g 11     hydroCHLOROthiazide (MICROZIDE) 12.5 mg capsule Take 1 capsule (12.5 mg total) by mouth daily. 90 capsule 3     losartan (COZAAR) 100 MG tablet Take 1 tablet (100 mg total) by mouth daily. 90 tablet 3     naproxen sodium (ALEVE) 220 MG tablet Take 220 mg by mouth every 12 (twelve) hours as needed for pain.    "    No current facility-administered medications for this visit.      Allergies   Allergen Reactions     Lisinopril Cough     EXAM  Vitals:    02/26/20 1019   BP: 128/72   Patient Site: Left Arm   Patient Position: Sitting   Cuff Size: Adult Regular   Pulse: 62   SpO2: 98%   Weight: 173 lb 6 oz (78.6 kg)   Height: 5' 9\" (1.753 m)     General: alert, no distress  HEENT: sclerae anicteric, moist oral mucosa. Excessive cerumen in bilateral ear canals but TM visible and pearly gray. Nares with clear nasal discharge and erythematous, boggy turbinates. Oropharynx non-erythematous, with small amount of whitish mucus.   Heart: Regular rate and rhythm, no murmurs.  No pretibial edema.  Warm extremities  Lungs: Clear to auscultation bilat  Skin: warm/dry, no rashes  Neuro: no gross abnormalities  Heme/endo/lymph: No lymphadenopathy of the head or neck.     LIZA Mayer on Internal Medicine Rotation 10:18 AM 2/26/2020      Addendum:     Attest above note by LIZA Mayer   Posterior nasal drainage makes the most likely diagnosis as a cause of the dry cough he is experiencing.  Less likely is losartan.  He has tolerated losartan for a while, but did recently have an ACE inhibitor cough late last year.  He agrees to the plan to do Atrovent nasal spray.  Alternately, he could use fluticasone, but I think that fluticasone would be somewhat less likely.  Strep test is negative.  No clinical concern for strep.  Lungs are clear, would not advocate for x-ray, especially in the absence of fever or any symptoms of feeling ill.    Castro Segundo, DO  Internal Medicine  [facility]    "

## 2021-06-06 NOTE — TELEPHONE ENCOUNTER
RN Assessment/Reason for Call:   Okay to leave Detailed Message  Gasper calling inDr nugent 3 weeks, itchy throat. Into chest.  Using cough drops.  B/P med lisinopril changed because of cough.  Mucous green thick,x once.     RN Action/Disposition:  Protocol recommends see  in 24 hrs.  Offered [place].  PCP out of office.  Appt  2/26/20 10:15 am  Call back if worse symptoms  Discussed home care measures.  Agrees to plan.     Reason for Disposition    [1] Continuous (nonstop) coughing interferes with work or school AND [2] no improvement using cough treatment per protocol    Change in color of sputum  (e.g., from white to yellow-green sputum)    Protocols used: COUGH - CHRONIC-A-AH

## 2021-06-12 NOTE — PROGRESS NOTES
Assessment and Plan:     Patient has been advised of split billing requirements and indicates understanding:No  1. Medicare annual wellness visit, subsequent  And is .  He lives independently with his wife.  Scores 5 on mini cog and has no cognitive deficits noted.  His health risk assessment was reviewed.  He is independent in activities of daily living and he has good health maintenance habits.  He exercises regularly.  He is an avid bike rider and is played over 60 rounds of golf this year.    2. Essential hypertension  Blood pressure control is good on losartan 100 mg  - Comprehensive Metabolic Panel  - Urinalysis-UC if Indicated    3. Screening for prostate cancer  PSA will be checked  - PSA (Prostatic-Specific Antigen), Annual Screen    4. Hypercholesterolemia  He has had a mild elevation in the cholesterol but no known coronary artery disease.  A fasting lipid cascade will be checked  - Lipid Hunt    5. Encounter for therapeutic drug monitoring  Monitoring labs are checked as outlined  - Uric Acid  - Comprehensive Metabolic Panel  - HM2(CBC w/o Differential)    6. Need for pneumococcal vaccination  Pneumo 23 booster will be given  - Pneumococcal polysaccharide vaccine 23-valent 3 yo or older, subq/IM    7.  History of basal cell skin cancers and numerous actinic keratosis:  Regular dermatology follow-up was emphasized    8.  Benign adenomatous polyp of the large intestine  Last colonoscopy was in 3/19.  5-year follow-up was advised at that time.  Plan:  1.  Labs as outlined.  He will be notified of test results    2.  Flu shot and pneumo 23 booster today    3.  Regular dermatology follow-up is recommended    4.  Colonoscopy is due in 4 years    5. The patient's current medical problems were reviewed.      The following health maintenance schedule was reviewed with the patient and provided in printed form in the after visit summary:   Health Maintenance   Topic Date Due     ZOSTER VACCINES (2 of 3)  11/24/2010     MEDICARE ANNUAL WELLNESS VISIT  01/25/2020     FALL RISK ASSESSMENT  11/02/2021     ADVANCE CARE PLANNING  09/18/2022     LIPID  01/27/2025     TD 18+ HE  07/07/2025     COLORECTAL CANCER SCREENING  03/13/2029     HEPATITIS C SCREENING  Completed     Pneumococcal Vaccine: 65+ Years  Completed     INFLUENZA VACCINE RULE BASED  Completed     Pneumococcal Vaccine: Pediatrics (0 to 5 Years) and At-Risk Patients (6 to 64 Years)  Aged Out     HEPATITIS B VACCINES  Aged Out        Subjective:   Chief Complaint: Gasper Andrews is an 75 y.o. male here for an Annual Wellness visit.   HPI: 75-year-old man seen for an annual wellness visit.  Gasper is  and retired.  He exercises avidly.  He has played over 60 rounds of golf, lifts weights, and rides a bicycle.  Scores 5 on mini cog and has no cognitive deficits noted.  His health risk assessment was reviewed.  Health maintenance habits are good he is independent in activities of daily living.    He is on losartan for hypertension.  Blood pressure has been good on this regimen    Has a history of colonic polyps.  Last colonoscopy in 3/19 showed no major abnormalities.  5-year follow-up was recommended    He will have a flu shot today.  Pneumo-23 booster also was advised    Has a history of basal cell cancer of the skin and numerous actinic keratosis.  He tries to be careful with sun protection    Review of Systems:   Please see above.  The rest of the review of systems are negative for all systems.    Patient Care Team:  Chilango Schrader MD as PCP - General  Enrique Miller MD as Physician (Dermatology)  Chilango Schrader MD as Assigned PCP     Patient Active Problem List   Diagnosis     Osteopenia     Benign Adenomatous Polyp Of The Large Intestine     Hypercholesterolemia     Actinic Keratosis     Basal Cell Carcinoma Of The Skin     No past medical history on file.   Past Surgical History:   Procedure Laterality Date     [place] TREAT INTER/SUBTROCH  FX,W/PLATE/SCREW      Description: Open Treatment Of An Intertrochanteric Fracture;  Proc Date: 2006;      Family History   Problem Relation Age of Onset     Hypertension Mother      Hypertension Brother      Memory loss Brother 76        Brother  of complications of Alzheimer's 10/30/2020      Social History     Socioeconomic History     Marital status:      Spouse name: Cheryl     Number of children: Not on file     Years of education: Not on file     Highest education level: Not on file   Occupational History     Not on file   Social Needs     Financial resource strain: Not on file     Food insecurity     Worry: Not on file     Inability: Not on file     Transportation needs     Medical: Not on file     Non-medical: Not on file   Tobacco Use     Smoking status: Never Smoker     Smokeless tobacco: Never Used   Substance and Sexual Activity     Alcohol use: Yes     Drug use: No     Sexual activity: Not on file   Lifestyle     Physical activity     Days per week: Not on file     Minutes per session: Not on file     Stress: Not on file   Relationships     Social connections     Talks on phone: Not on file     Gets together: Not on file     Attends Zoroastrianism service: Not on file     Active member of club or organization: Not on file     Attends meetings of clubs or organizations: Not on file     Relationship status: Not on file     Intimate partner violence     Fear of current or ex partner: Not on file     Emotionally abused: Not on file     Physically abused: Not on file     Forced sexual activity: Not on file   Other Topics Concern     Not on file   Social History Narrative     Not on file      Current Outpatient Medications   Medication Sig Dispense Refill     losartan (COZAAR) 100 MG tablet Take 1 tablet (100 mg total) by mouth daily. 90 tablet 3     No current facility-administered medications for this visit.       Objective:   Vital Signs:   Visit Vitals  /74 (Patient Site: Right Arm,  "Patient Position: Sitting, Cuff Size: Adult Regular)   Pulse (!) 54   Ht 5' 10\" (1.778 m)   Wt 168 lb 4 oz (76.3 kg)   SpO2 98%   BMI 24.14 kg/m           VisionScreening:  No exam data present     PHYSICAL EXAM  /74 (Patient Site: Right Arm, Patient Position: Sitting, Cuff Size: Adult Regular)   Pulse (!) 54   Ht 5' 10\" (1.778 m)   Wt 168 lb 4 oz (76.3 kg)   SpO2 98%   BMI 24.14 kg/m      General Appearance:  Alert, cooperative, no distress, appears stated age   Head:  Normocephalic, without obvious abnormality, atraumatic   Eyes:  PERRL, conjunctiva/corneas clear, EOM's intact   Ears:  Normal TM's and external ear canals, both ears   Nose: Nares normal, septum midline, mucosa normal, no drainage   Throat: Lips, mucosa, and tongue normal; teeth and gums normal   Neck: Supple, symmetrical, trachea midline, no adenopathy, thyroid: not enlarged, symmetric, no tenderness/mass/nodules, no carotid bruit or JVD   Back:   Symmetric, no curvature, ROM normal, no CVA tenderness   Lungs:   Clear to auscultation bilaterally, respirations unlabored   Chest Wall:  No tenderness or deformity   Heart:  Regular rate and rhythm, S1, S2 normal, no murmur, rub or gallop   Abdomen:   Soft, non-tender, bowel sounds active all four quadrants,  no masses, no organomegaly.  Left lower abdominal scar   Genitalia:  No penile lesions or testicular masses   Rectal:  Normal tone, normal prostate, no masses or tenderness   Extremities: Extremities normal, atraumatic, no cyanosis or edema   Skin:  Numerous actinic keratoses on the forearm.  Solar elastosis   Lymph nodes: Cervical and supraclavicular normal   Neurologic: No dysarthria or aphasia.  Cranial nerves, motor and sensory exams intact         Assessment Results 11/2/2020   Activities of Daily Living No help needed   Instrumental Activities of Daily Living No help needed   Get Up and Go Score -   Mini Cog Total Score 5   Some recent data might be hidden     A Mini-Cog score of " 0-2 suggests the possibility of dementia, score of 3-5 suggests no dementia        Identified Health Risks:     The patient was counseled and encouraged to consider modifying their diet and eating habits. He was provided with information on recommended healthy diet options.  Patient's advanced directive was discussed and I am comfortable with the patient's wishes.

## 2021-06-12 NOTE — PROGRESS NOTES
Gasper: Lipids are good with a total cholesterol of 201 and an LDL fraction of 138.  The PSA, (prostate-specific antigen), is in the normal range at 2.0.  Other labs including your hemoglobin, potassium, blood sugar, liver and kidney tests are normal.  It was nice to see you.    Chilango Schrader MD

## 2021-06-13 NOTE — PROGRESS NOTES
Assessment and Plan:   Annual wellness assessment:  Gasper scores 5 on mini cog.  No issues with depression are noted.  His health risk assessment was reviewed.  No concerns were raised.  He lives independently with his wife and exercises regularly including biking and golf.  He reports that he has a well, but is not sure if he has an active healthcare directive.  He was given a new form and encouraged to fill it out.    1.  Actinic keratoses with history of skin cancer:  He was encouraged to have regular follow-up with Dr. Enrique Miller    2.  Plugging right ear due to cerumen impaction:  Symptoms resolved after ear irrigation    3.  Colonic polyps: Next colonoscopy is due March 2019    4.  Health maintenance:  Body fat is good for age at 21.7%.  Flu shot was advised today.  Monitoring labs will be checked.  Health maintenance and screening issues were discussed    Plan:  1.  Labs as discussed  2.  Schedule dermatology follow-up with Dr. Enrique Miller  3.  Flu shot  4.  He will be notified of test results.  Clinic follow-up in 1 year or as needed.  The patient's current medical problems were reviewed.    I have had an Advance Directives discussion with the patient.  The following health maintenance schedule was reviewed with the patient and provided in printed form in the after visit summary:   Health Maintenance   Topic Date Due     FALL RISK ASSESSMENT  09/18/2018     COLONOSCOPY  03/12/2019     ADVANCE DIRECTIVES DISCUSSED WITH PATIENT  09/18/2022     TD 18+ HE  07/07/2025     PNEUMOCOCCAL POLYSACCHARIDE VACCINE AGE 65 AND OVER  Completed     INFLUENZA VACCINE RULE BASED  Completed     PNEUMOCOCCAL CONJUGATE VACCINE FOR ADULTS (PCV13 OR PREVNAR)  Completed     ZOSTER VACCINE  Completed        Subjective:   Chief Complaint: Gasper Andrews is an 72 y.o. male here for an Annual Wellness visit.   HPI:  He comes fasting to the clinic today.     Health Maintenance: He received the influenza vaccination today. All of his  other immunizations are up to date at this time. He had a colonoscopy completed in March 2014 and a polyp was removed.     Review of Systems:  He has felt well from a health standpoint. His right ear was very plugged when he was traveling in [place]. He has had mild nasal congestion. He received an ear wash today. He bikes for exercise and has biked roughly 1500 miles this year so far. He needs to schedule a dermatology appointment yet this year. He has numerous actinic keratoses scattered over his body.    Please see above.  The rest of the review of systems are negative for all systems.    Patient Care Team:  Chilango Schrader MD as PCP - General  Enrique Miller MD as Physician (Dermatology)       Patient Active Problem List   Diagnosis     Osteopenia     Benign Adenomatous Polyp Of The Large Intestine     Hyperlipidemia     Actinic Keratosis     Basal Cell Carcinoma Of The Skin     History reviewed. No pertinent past medical history.   Past Surgical History:   Procedure Laterality Date     [place] TREAT INTER/SUBTROCH FX,W/PLATE/SCREW      Description: Open Treatment Of An Intertrochanteric Fracture;  Proc Date: 01/01/2006;      Family History   Problem Relation Age of Onset     Hypertension Mother      Hypertension Brother      Memory loss Brother 76     not clinically diagnosed yet   His brother is 76 years old and is having memory issues.      Social History     Social History     Marital status:      Spouse name: N/A     Number of children: N/A     Years of education: N/A     Occupational History     Not on file.     Social History Main Topics     Smoking status: Never Smoker     Smokeless tobacco: Not on file     Alcohol use Yes     Drug use: No     Sexual activity: Not on file     Other Topics Concern     Not on file     Social History Narrative    Social: He was recently traveling in [place] for just under one month. He spent time in [place]. He returned about 4 days ago. He will be going to [place], [place]  "this winter to golf for eight days.     Current Outpatient Prescriptions   Medication Sig Dispense Refill     fluticasone (FLONASE) 50 mcg/actuation nasal spray 2 sprays into each nostril daily. 10 g 11     naproxen sodium (ALEVE) 220 MG tablet Take 220 mg by mouth every 12 (twelve) hours as needed for pain.       No current facility-administered medications for this visit.       Objective:   Vital Signs:   Visit Vitals     /84 (Patient Site: Left Arm, Patient Position: Sitting, Cuff Size: Adult Regular)     Pulse 64     Ht 5' 9.5\" (1.765 m)     Wt 164 lb 9.6 oz (74.7 kg)     BMI 23.96 kg/m2        Vision Screening:  No exam data present     PHYSICAL EXAM  Body fat percentage: 21.7%  Constitutional:   Reveals an alert, pleasant male with appropriate affect. Does not appear acutely ill.  Vitals: per nursing notes.  HEENT:  Ears:  External canals, TMs clear.    Eyes:  EOMs full, PERRL.  Oropharynx:   Mouth and throat clear, no thrush or exudate.  Neck:  Supple, no carotid bruits or adenopathy.  Back:  No spine or CVA pain.  Thorax:  Deformity of the left clavicle consistent with previous displacement fracture.   Lungs: Clear to A&P without rales or wheezes.  Respiratory effort normal.  Cardiac:   Regular rate and rhythm, normal S1, S2, no murmur or gallop.  Abdomen:  Soft, active bowel sounds without bruits, mass, or tenderness.  :  (Men)  No testicular masses, no penile lesions.  Circumcised.   Rectal: No masses.   Prostate without nodules.  No inguinal hernias.  Extremities:   No peripheral edema, pulses in the feet intact.  Well developed. No significant joint deformities.  Skin:  Innumerous actinic keratoses throughout, no other skin lesions.   Neuro:  Alert and oriented. Cranial nerves, motor, sensory exams are intact.  No gross focal deficits.  Psychiatric:  Memory intact, mood appropriate.    Assessment Results 9/18/2017   Activities of Daily Living No help needed   Instrumental Activities of Daily " Living No help needed   Get Up and Go Score Less than 12 seconds   Mini Cog Total Score 5   Some recent data might be hidden     A Mini-Cog score of 0-2 suggests the possibility of dementia, score of 3-5 suggests no dementia    Identified Health Risks:     The patient was counseled and encouraged to consider modifying their diet and eating habits. He was provided with information on recommended healthy diet options.  Information regarding advance directives (living dwyer), including where he can download the appropriate form, was provided to the patient via the AVS.     ADDITIONAL HISTORY SUMMARIZED (2): Reviewed colonoscopy report from 3/12/14; polyp removed, 5 year follow up plan. Reviewed physical from 8/6/16; body fat percentage 24%.   DECISION TO OBTAIN EXTRA INFORMATION (1): None.   RADIOLOGY TESTS (1): None.  LABS (1): Ordered labs today.   MEDICINE TESTS (1): None.  INDEPENDENT REVIEW (2 each): None.     The visit lasted a total of 25 minutes face to face with the patient. Over 50% of the time was spent counseling and educating the patient about AWV. An additional two minute was spent counseling the patient on advanced care directives.     IMayuri, am scribing for and in the presence of, Dr. Chilango Schrader.    I, Dr. GLENIS Schrader, personally performed the services described in this documentation, as scribed by Mayuri June in my presence, and it is both accurate and complete.    Total data points: 3

## 2021-06-15 NOTE — TELEPHONE ENCOUNTER
Refill Approved    Rx renewed per Medication Renewal Policy. Medication was last renewed on 1/27/20.    Bonilla Ortega, [facility] Triage/Med Refill 3/4/2021     Requested Prescriptions   Pending Prescriptions Disp Refills     losartan (COZAAR) 100 MG tablet [Pharmacy Med Name: LOSARTAN 100MG TAB] 90 tablet 3     Sig: TAKE ONE TABLET BY MOUTH EVERY DAY       Angiotensin Receptor Blocker Protocol Passed - 3/3/2021  4:39 PM        Passed - PCP or prescribing provider visit in past 12 months       Last office visit with prescriber/PCP: 1/27/2020 Chilango Schrader MD OR same dept: Visit date not found OR same specialty: 2/26/2020 Castro Segundo DO  Last physical: 11/2/2020 Last MTM visit: Visit date not found   Next visit within 3 mo: Visit date not found  Next physical within 3 mo: Visit date not found  Prescriber OR PCP: Chilango Schrader MD  Last diagnosis associated with med order: 1. Essential hypertension, benign  - losartan (COZAAR) 100 MG tablet [Pharmacy Med Name: LOSARTAN 100MG TAB]; TAKE ONE TABLET BY MOUTH EVERY DAY  Dispense: 90 tablet; Refill: 3    If protocol passes may refill for 12 months if within 3 months of last provider visit (or a total of 15 months).             Passed - Serum potassium within the past 12 months     Lab Results   Component Value Date    Potassium 4.4 11/02/2020             Passed - Blood pressure filed in past 12 months     BP Readings from Last 1 Encounters:   11/02/20 136/74             Passed - Serum creatinine within the past 12 months     Creatinine   Date Value Ref Range Status   11/02/2020 0.77 0.70 - 1.30 mg/dL Final

## 2021-06-17 NOTE — PATIENT INSTRUCTIONS - HE
Patient Instructions by Raza Post Scribe at 1/25/2019  2:10 PM     Author: Raza Post Scribe Service: -- Author Type: Koby    Filed: 1/25/2019  3:20 PM Encounter Date: 1/25/2019 Status: Addendum    : Chilango Schrader MD (Physician)    Related Notes: Original Note by Chilango Schrader MD (Physician) filed at 1/25/2019  3:18 PM         Patient Education   Understanding USDA MyPlate  The USDA (US Department of Agriculture) has guidelines to help you make healthy food choices. These are called MyPlate. MyPlate shows the food groups that make up healthy meals using the image of a place setting. Before you eat, think about the healthiest choices for what to put onto your plate or into your cup or bowl. To learn more about building a healthy plate, visit www.choosemyplate.gov.       The Food Groups    Fruits: Any fruit or 100% fruit juice counts as part of the Fruit Group. Fruits may be fresh, canned, frozen, or dried, and may be whole, cut-up, or pureed. Make half your plate fruits and vegetables.    Vegetables: Any vegetable or 100% vegetable juice counts as a member of the Vegetable Group. Vegetables may be fresh, frozen, canned, or dried. They can be served raw or cooked and may be whole, cut-up, or mashed. Make half your plate fruits and vegetables.     Grains: All foods made from grains are part of the Grains Group. These include wheat, rice, oats, cornmeal, and barley such as bread, pasta, oatmeal, cereal, tortillas, and grits. Grains should be no more than a quarter of your plate. At least half of your grains should be whole grains.    Protein: This group includes meat, poultry, seafood, beans and peas, eggs, processed soy products (like tofu), nuts (including nut butters), and seeds. Make protein choices no more than a quarter of your plate. Meat and poultry choices should be lean or low fat.    Dairy: All fluid milk products and foods made from milk that contain calcium, like yogurt and  cheese are part of the Dairy Group. (Foods that have little calcium, such as cream, butter, and cream cheese, are not part of the group.) Most dairy choices should be low-fat or fat-free.    Oils: These are fats that are liquid at room temperature. They include canola, corn, olive, soybean, and sunflower oil. Foods that are mainly oil include mayonnaise, certain salad dressings, and soft margarines. You should have only 5 to 7 teaspoons of oils a day. You probably already get this much from the food you eat.  Use WIN Advanced Systemser to Help Build Your Meals  The Lipocalyxcker can help you plan and track your meals and activity. You can look up individual foods to see or compare their nutritional value. You can get guidelines for what and how much you should eat. You can compare your food choices. And you can assess personal physical activities and see ways you can improve. Go to www.Car[employer].gov/IKOR METERINGcker/.    7755-4216 [employer]. [address] [place] [address]. All rights reserved. This information is not intended as a substitute for professional medical care. Always follow your healthcare professional's instructions.           Patient Education   Understanding Advance Care Planning  Advance care planning is the process of deciding ones own future medical care. It helps ensure that if you cant speak for yourself, your wishes can still be carried out. The plan is a series of legal documents that note a persons wishes. The documents vary by state. Advance care planning may be done when a person has a serious illness that is expected to get worse. It may be done before major surgery. And it can help you and your family be prepared in case of a major illness or injury. Advance care planning helps with making decisions at these times.       A health care proxy is a person who acts as the voice of a patient when the patient cant speak for himself or herself. The name of this role varies by  state. It may be called a Durable Medical Power of  or Durable Power of  for Healthcare. It may be called an agent, surrogate, or advocate. Or it may be called a representative or decision maker. It is an official duty that is identified by a legal document. The document also varies by state.    Why Is Advance Care Planning Important?  If a person communicates their healthcare wishes:    They will be given medical care that matches their values and goals.    Their family members will not be forced to make decisions in a crisis with no guidance.  Creating a Plan  Making an advance care plan is often done in 3 steps:    Thinking about ones wishes. To create an advance care plan, you should think about what kind of medical treatment you would want if you lose the ability to communicate. Are there any situations in which you would refuse or stop treatment? Are there therapies you would want or not want? And whom do you want to make decisions for you? There are many places to learn more about how to plan for your care. Ask your doctor or  for resources.    Picking a health care proxy. This means choosing a trusted person to speak for you only when you cant speak for yourself. When you cannot make medical decisions, your proxy makes sure the instructions in your advance care plan are followed. A proxy does not make decisions based on his or her own opinions. They must put aside those opinions and values if needed, and carry out your wishes.    Filling out the legal documents. There are several kinds of legal documents for advance care planning. Each one tells health care providers your wishes. The documents may vary by state. They must be signed and may need to be witnessed or notarized. You can cancel or change them whenever you wish. Depending on your state, the documents may include a Healthcare Proxy form, Living Will, Durable Medical Power of , Advance Directive, or others.  The  Familys Role  The best help a family can give is to support their loved ones wishes. Open and honest communication is vital. Family should express any concerns they have about the patients choices while the patient can still make decisions.    6191-7902 [employer]. [address] [place] [address], [place] [address]. All rights reserved. This information is not intended as a substitute for professional medical care. Always follow your healthcare professional's instructions.         Also, [facility] offers a free, downloadable health care directive that allows you to share your treatment choices and personal preferences if you cannot communicate your wishes. It also allows you to appoint another person (called a health care agent) to make health care decisions if you are unable to do so. You can download an advance directive by going here: [email or web address]/[facility] [employer] [facility].html     Patient Education   Personalized Prevention Plan  You are due for the preventive services outlined below.  Your care team is available to assist you in scheduling these services.  If you have already completed any of these items, please share that information with your care team to update in your medical record.  Health Maintenance   Topic Date Due   ? ZOSTER VACCINES (2 of 3) 11/24/2010   ? INFLUENZA VACCINE RULE BASED (1) 08/01/2018   ? COLONOSCOPY  03/12/2019   ? FALL RISK ASSESSMENT  01/25/2020   ? ADVANCE DIRECTIVES DISCUSSED WITH PATIENT  09/18/2022   ? TD 18+ HE  07/07/2025   ? PNEUMOCOCCAL POLYSACCHARIDE VACCINE AGE 65 AND OVER  Completed   ? PNEUMOCOCCAL CONJUGATE VACCINE FOR ADULTS (PCV13 OR PREVNAR)  Completed         1.  Colonoscopy is due in march.  GI office will call.    2.  Shingrix vaccine advised.  Check insurance    3.  Start lisinopril 20 mg.  One half daily for six days, then one daily    4.  Report BP readings in one month.  See for BP check in three months.

## 2021-06-18 NOTE — PATIENT INSTRUCTIONS - HE
Patient Instructions by Chilango Schrader MD at 11/2/2020  9:10 AM     Author: Chilango Schrader MD Service: -- Author Type: Physician    Filed: 11/2/2020  1:33 PM Encounter Date: 11/2/2020 Status: Addendum    : Chilango Schrader MD (Physician)    Related Notes: Original Note by Chilango Schrader MD (Physician) filed at 11/2/2020  9:34 AM       1.  Flu shot and pneumo-23 booster today    2.  Colonoscopy in four years    3.  Continue current medications.    4.  I will notify you of test results    5.  See in one yesr or as needed.  Patient Education   Understanding [employer] MyPlate  The USDA (US Department of Agriculture) has guidelines to help you make healthy food choices. These are called MyPlate. MyPlate shows the food groups that make up healthy meals using the image of a place setting. Before you eat, think about the healthiest choices for what to put onto your plate or into your cup or bowl. To learn more about building a healthy plate, visit www.choosemyplate.gov.       The Food Groups    Fruits: Any fruit or 100% fruit juice counts as part of the Fruit Group. Fruits may be fresh, canned, frozen, or dried, and may be whole, cut-up, or pureed. Make half your plate fruits and vegetables.    Vegetables: Any vegetable or 100% vegetable juice counts as a member of the Vegetable Group. Vegetables may be fresh, frozen, canned, or dried. They can be served raw or cooked and may be whole, cut-up, or mashed. Make half your plate fruits and vegetables.     Grains: All foods made from grains are part of the Grains Group. These include wheat, rice, oats, cornmeal, and barley such as bread, pasta, oatmeal, cereal, tortillas, and grits. Grains should be no more than a quarter of your plate. At least half of your grains should be whole grains.    Protein: This group includes meat, poultry, seafood, beans and peas, eggs, processed soy products (like tofu), nuts (including nut butters), and seeds. Make protein choices no more  than a quarter of your plate. Meat and poultry choices should be lean or low fat.    Dairy: All fluid milk products and foods made from milk that contain calcium, like yogurt and cheese are part of the Dairy Group. (Foods that have little calcium, such as cream, butter, and cream cheese, are not part of the group.) Most dairy choices should be low-fat or fat-free.    Oils: These are fats that are liquid at room temperature. They include canola, corn, olive, soybean, and sunflower oil. Foods that are mainly oil include mayonnaise, certain salad dressings, and soft margarines. You should have only 5 to 7 teaspoons of oils a day. You probably already get this much from the food you eat.  Use [employer] to Help Build Your Meals  The [employer] Sem[employer]cker can help you plan and track your meals and activity. You can look up individual foods to see or compare their nutritional value. You can get guidelines for what and how much you should eat. You can compare your food choices. And you can assess personal physical activities and see ways you can improve. Go to www.Cirro.gov/Healthkartcker/.    5365-0751 [employer]®. [address] [place] [address]. All rights reserved. This information is not intended as a substitute for professional medical care. Always follow your healthcare professional's instructions.             Advance Directive  Patients advance directive was discussed and I am comfortable with the patients wishes.  Patient Education   Personalized Prevention Plan  You are due for the preventive services outlined below.  Your care team is available to assist you in scheduling these services.  If you have already completed any of these items, please share that information with your care team to update in your medical record.  Health Maintenance   Topic Date Due   ? ZOSTER VACCINES (2 of 3) 11/24/2010   ? MEDICARE ANNUAL WELLNESS VISIT  11/02/2021   ? FALL RISK ASSESSMENT  11/02/2021   ? LIPID   01/27/2025   ? TD 18+ HE  07/07/2025   ? ADVANCE CARE PLANNING  11/02/2025   ? COLORECTAL CANCER SCREENING  03/13/2029   ? HEPATITIS C SCREENING  Completed   ? Pneumococcal Vaccine: 65+ Years  Completed   ? INFLUENZA VACCINE RULE BASED  Completed   ? Pneumococcal Vaccine: Pediatrics (0 to 5 Years) and At-Risk Patients (6 to 64 Years)  Aged Out   ? HEPATITIS B VACCINES  Aged Out

## 2021-06-23 NOTE — PROGRESS NOTES
Assessment and Plan:   Annual wellness assessment:  Gasper lives independently with his wife.  He exercises regularly and has good health maintenance habits.  No cognitive deficits are noted.  He does not have a healthcare directive and would be encouraged to work on this    1. Screen for colon cancer  He is on 5-year follow-up due to a past history of colonic polyps.  Next study would be due in March  - Ambulatory referral for Colonoscopy    2. Screening for prostate cancer  PSA will be checked today  - PSA, Annual Screen (Prostatic-Specific Antigen); Future    3. Healthcare maintenance  Monitoring labs are checked  - Urinalysis-UC if Indicated; Future  - HM2(CBC w/o Differential); Future  - Comprehensive Metabolic Panel; Future    4. Vitamin D deficiency  He is not currently taking a vitamin D supplement.  A level will be checked  - Vitamin D, Total (25-Hydroxy); Future    5. Hypercholesterolemia  Fasting lipid cascade will be checked he has no known history of ischemic heart disease  - Lipid Cascade; Future    6. Essential hypertension, benign  Blood pressure was substantially elevated when checked by MD and moderately elevated on other checks outside the clinic.  He has noted recent situational stress caring for a brother with advanced dementia.  Would advise beginning antihypertensive treatment  - lisinopril (PRINIVIL,ZESTRIL) 20 MG tablet; Take 1 tablet (20 mg total) by mouth daily.  Dispense: 30 tablet; Refill: 11    7. Flu vaccine need  Flu shot will be given.  Shingrix would be advised for the future  - Influenza High Dose, Seasonal    8.  History of skin cancers:  He has multiple actinic keratosis.  Regular follow-up with Dr. Miller was advised    Plan:  1.  The patient's current medical problems were reviewed.  2.  Return for fasting lab  3.  Colonoscopy due in March  4.  Flu shot today.  Check insurance for Shingrix.  5.  Start lisinopril 20 mg.  One half daily for 6 days, then 1 tab daily.  6.  Monitor  blood pressure.  Report readings in 1 month.  Clinic follow-up 3 months  7.  Regular dermatology follow-up is advised  The following high BMI interventions were performed this visit: encouragement to exercise  The following health maintenance schedule was reviewed with the patient and provided in printed form in the after visit summary:   Health Maintenance   Topic Date Due     ZOSTER VACCINES (2 of 3) 11/24/2010     INFLUENZA VACCINE RULE BASED (1) 08/01/2018     COLONOSCOPY  03/12/2019     FALL RISK ASSESSMENT  01/25/2020     ADVANCE DIRECTIVES DISCUSSED WITH PATIENT  09/18/2022     TD 18+ HE  07/07/2025     PNEUMOCOCCAL POLYSACCHARIDE VACCINE AGE 65 AND OVER  Completed     PNEUMOCOCCAL CONJUGATE VACCINE FOR ADULTS (PCV13 OR PREVNAR)  Completed        Subjective:   Chief Complaint: Gasper Andrews is an 73 y.o. male here for an Annual Wellness visit.   HPI:      HTN: His bp is a little elevated. 140/80 in office today. 155/70 at ortho recently. He has a cuff at home and used it the last two days. Getting systolic around 150. He thinks he is stressed out. He has a visitor and an upcoming trip to [place].     Left Knee pain: He has been over doing it on cycling, and is having some knee pain. He was advised to do more gluteus irma exercises to balance it out.     Pain management: He takes Aleve when he golfs. He has not taken it in a while.     Sinus congestion: He was talking Flonase, then stopped and just got used to being stuffed up.     Derm: He has an upcoming derm appointment with Dr. Miller. He has had prior spots removed with dermatologist in the past.  Has had multiple skin cancers  Health Maintenance: Flu shot given in office today. Colonoscopy is due in March due to a past history of colonic polyps    Review of Systems:    Denies malaise, fatigue, shortness of breath, /GI complains.   Please see above.  The rest of the review of systems are negative for all systems.    PMFS:  He golfs. He has been  doing Yoga twice a week.   He has an upcoming trip to [place].   He takes Vit D  Reviewed as below.     Patient Care Team:  Chilango Schrader MD as PCP - Enrique Grace MD as Physician (Dermatology)     Patient Active Problem List   Diagnosis     Osteopenia     Benign Adenomatous Polyp Of The Large Intestine     Hyperlipidemia     Actinic Keratosis     Basal Cell Carcinoma Of The Skin     No past medical history on file.   Past Surgical History:   Procedure Laterality Date     PA TREAT INTER/SUBTROCH FX,W/PLATE/SCREW      Description: Open Treatment Of An Intertrochanteric Fracture;  Proc Date: 01/01/2006;      Family History   Problem Relation Age of Onset     Hypertension Mother      Hypertension Brother      Memory loss Brother 76        not clinically diagnosed yet      Social History     Socioeconomic History     Marital status:      Spouse name: Not on file     Number of children: Not on file     Years of education: Not on file     Highest education level: Not on file   Social Needs     Financial resource strain: Not on file     Food insecurity - worry: Not on file     Food insecurity - inability: Not on file     Transportation needs - medical: Not on file     Transportation needs - non-medical: Not on file   Occupational History     Not on file   Tobacco Use     Smoking status: Never Smoker   Substance and Sexual Activity     Alcohol use: Yes     Drug use: No     Sexual activity: Not on file   Other Topics Concern     Not on file   Social History Narrative     Not on file      Current Outpatient Medications   Medication Sig Dispense Refill     fluticasone (FLONASE) 50 mcg/actuation nasal spray 2 sprays into each nostril daily. 10 g 11     naproxen sodium (ALEVE) 220 MG tablet Take 220 mg by mouth every 12 (twelve) hours as needed for pain.       lisinopril (PRINIVIL,ZESTRIL) 20 MG tablet Take 1 tablet (20 mg total) by mouth daily. 30 tablet 11     No current facility-administered  "medications for this visit.       Objective:   Vital Signs:   Visit Vitals  /80   Pulse (!) 54   Ht 5' 9.25\" (1.759 m)   Wt 171 lb 1.6 oz (77.6 kg)   SpO2 98%   BMI 25.08 kg/m         VisionScreening:  No exam data present     PHYSICAL EXAM  Constitutional:   Reveals an alert oriented man in no acute distress, affect appropriate.  184/80, 178/80 on rechecks with HR 54.. Vitals: per nursing notes.  HEENT: atraumatic Ears:  External canals, TMs clear.    Eyes:  EOMs full, PERRL.  Oropharynx:   Mouth and throat clear, no thrush or exudate.  Neck:  Supple, no carotid bruits or adenopathy.  Back:  No spine or CVA pain.  Thorax: Deformed left clavicle related to previous fracture.  Lungs: Clear to A&P without rales or wheezes.  Respiratory effort normal.  Cardiac:   Regular rate and rhythm, normal S1, S2, no murmur or gallop.  Abdomen:  Soft, active bowel sounds without bruits, mass, or tenderness.  : Circumcised.  No testicular masses, no penile lesions. No inguinal hernias.  Rectal: No masses.   Prostate without nodules.    Extremities:   No peripheral edema, pulses in the feet intact.    Skin:  Fair with numerous actinic keratosis on forearms and face, No jaundice, peripheral cyanosis or lesions to suggest malignancy.  Neuro:  Alert and oriented. Cranial nerves, motor, sensory exams are intact.  No gross focal deficits.  Psychiatric:  Memory intact, mood appropriate.      Assessment Results 1/25/2019   Activities of Daily Living No help needed   Instrumental Activities of Daily Living No help needed   Get Up and Go Score Less than 12 seconds   Mini Cog Total Score 5   Some recent data might be hidden     A Mini-Cog score of 0-2 suggests the possibility of dementia, score of 3-5 suggests no dementia    Identified Health Risks:     The patient was counseled and encouraged to consider modifying their diet and eating habits. He was provided with information on recommended healthy diet options.  Information " regarding advance directives (living dwyer), including where he can download the appropriate form, was provided to the patient via the AVS.     ADDITIONAL HISTORY SUMMARIZED (2): 3/12/14 Colonoscopy reviewed for clearance showing 5 year follow up. 1/23/19 [facility] note reviewed showing left knee pain evaluation.   DECISION TO OBTAIN EXTRA INFORMATION (1): None.   RADIOLOGY TESTS (1): None.  LABS (1): 9/18/17 labs reviewed and ordered labs today.   MEDICINE TESTS (1): Colonoscopy ordered today.   INDEPENDENT REVIEW (2 each): None.     The visit lasted a total of 29 minutes face to face with the patient. Over 50% of the time was spent counseling and educating the patient about knee pain, HTN and health maintenance.    I, Raza Post, am scribing for and in the presence of, Dr. Schrader.    I, Dr. Chilango Schrader, personally performed the services described in this documentation, as scribed by Raza oPst in my presence, and it is both accurate and complete.    Total data points: 4

## 2021-06-26 ENCOUNTER — HEALTH MAINTENANCE LETTER (OUTPATIENT)
Age: 76
End: 2021-06-26

## 2021-10-16 ENCOUNTER — HEALTH MAINTENANCE LETTER (OUTPATIENT)
Age: 76
End: 2021-10-16

## 2021-12-09 DIAGNOSIS — I10 ESSENTIAL HYPERTENSION, BENIGN: ICD-10-CM

## 2021-12-09 RX ORDER — LOSARTAN POTASSIUM 100 MG/1
TABLET ORAL
Qty: 90 TABLET | Refills: 3 | Status: SHIPPED | OUTPATIENT
Start: 2021-12-09 | End: 2022-02-25

## 2021-12-09 NOTE — TELEPHONE ENCOUNTER
"Last Written Prescription Date:  3/4/2021  Last Fill Quantity: 90,  # refills:2  Last office visit provider: 11/2/2020    Requested Prescriptions   Pending Prescriptions Disp Refills     losartan (COZAAR) 100 MG tablet 90 tablet 3     Sig: [LOSARTAN (COZAAR) 100 MG TABLET] TAKE ONE TABLET BY MOUTH EVERY DAY       Angiotensin-II Receptors Failed - 12/9/2021 12:08 PM        Failed - Last blood pressure under 140/90 in past 12 months     BP Readings from Last 3 Encounters:   11/02/20 136/74   02/26/20 128/72   01/27/20 126/70                 Failed - Recent (12 mo) or future (30 days) visit within the authorizing provider's specialty     Patient has had an office visit with the authorizing provider or a provider within the authorizing providers department within the previous 12 mos or has a future within next 30 days. See \"Patient Info\" tab in inbasket, or \"Choose Columns\" in Meds & Orders section of the refill encounter.              Failed - Normal serum creatinine on file in past 12 months     Recent Labs   Lab Test 11/02/20  0949   CR 0.77       Ok to refill medication if creatinine is low          Failed - Normal serum potassium on file in past 12 months     Recent Labs   Lab Test 11/02/20  0949   POTASSIUM 4.4                    Passed - Medication is active on med list        Passed - Patient is age 18 or older             Benjamin Carolina RN 12/09/21 1:22 PM  "

## 2021-12-09 NOTE — TELEPHONE ENCOUNTER
Reason for Call:  Medication or medication refill:    Do you use a [facility]?  Name of the pharmacy and phone number for the current request:  [facility]    Name of the medication requested:   losartan (COZAAR) 100 MG tablet 90 tablet 2 3/4/2021  No   Sig: [LOSARTAN (COZAAR) 100 MG TABLET] TAKE ONE TABLET BY MOUTH EVERY DAY         Other request: pharm has faxed several times and no response.  Pt is leaving for out of town on Wednesday and needs refill.  Pt only has 1 pill left.    Can we leave a detailed message on this number? YES    Phone number patient can be reached at: Cell number on file:    Telephone Information:   Mobile [phone]       Best Time: any    Call taken on 12/9/2021 at 11:48 AM by Pam J. Behr

## 2021-12-11 ENCOUNTER — HEALTH MAINTENANCE LETTER (OUTPATIENT)
Age: 76
End: 2021-12-11

## 2022-02-25 ENCOUNTER — OFFICE VISIT (OUTPATIENT)
Dept: INTERNAL MEDICINE | Facility: CLINIC | Age: 77
End: 2022-02-25
Payer: COMMERCIAL

## 2022-02-25 VITALS
WEIGHT: 173 LBS | HEIGHT: 70 IN | DIASTOLIC BLOOD PRESSURE: 72 MMHG | SYSTOLIC BLOOD PRESSURE: 126 MMHG | HEART RATE: 54 BPM | BODY MASS INDEX: 24.77 KG/M2 | OXYGEN SATURATION: 96 %

## 2022-02-25 DIAGNOSIS — E78.00 HYPERCHOLESTEROLEMIA: ICD-10-CM

## 2022-02-25 DIAGNOSIS — Z86.0100 HISTORY OF COLONIC POLYPS: ICD-10-CM

## 2022-02-25 DIAGNOSIS — Z23 NEED FOR INFLUENZA VACCINATION: ICD-10-CM

## 2022-02-25 DIAGNOSIS — H61.23 BILATERAL IMPACTED CERUMEN: ICD-10-CM

## 2022-02-25 DIAGNOSIS — D12.6 BENIGN NEOPLASM OF COLON, UNSPECIFIED PART OF COLON: ICD-10-CM

## 2022-02-25 DIAGNOSIS — Z00.00 MEDICARE ANNUAL WELLNESS VISIT, SUBSEQUENT: Primary | ICD-10-CM

## 2022-02-25 DIAGNOSIS — Z12.5 SCREENING FOR PROSTATE CANCER: ICD-10-CM

## 2022-02-25 DIAGNOSIS — I10 ESSENTIAL HYPERTENSION, BENIGN: ICD-10-CM

## 2022-02-25 LAB
ALBUMIN SERPL-MCNC: 3.8 G/DL (ref 3.5–5)
ALBUMIN UR-MCNC: NEGATIVE MG/DL
ALP SERPL-CCNC: 49 U/L (ref 45–120)
ALT SERPL W P-5'-P-CCNC: 16 U/L (ref 0–45)
ANION GAP SERPL CALCULATED.3IONS-SCNC: 9 MMOL/L (ref 5–18)
APPEARANCE UR: CLEAR
AST SERPL W P-5'-P-CCNC: 15 U/L (ref 0–40)
BILIRUB SERPL-MCNC: 0.8 MG/DL (ref 0–1)
BILIRUB UR QL STRIP: NEGATIVE
BUN SERPL-MCNC: 13 MG/DL (ref 8–28)
CALCIUM SERPL-MCNC: 8.9 MG/DL (ref 8.5–10.5)
CHLORIDE BLD-SCNC: 105 MMOL/L (ref 98–107)
CHOLEST SERPL-MCNC: 190 MG/DL
CO2 SERPL-SCNC: 25 MMOL/L (ref 22–31)
COLOR UR AUTO: YELLOW
CREAT SERPL-MCNC: 0.75 MG/DL (ref 0.7–1.3)
ERYTHROCYTE [DISTWIDTH] IN BLOOD BY AUTOMATED COUNT: 12.5 % (ref 10–15)
FASTING STATUS PATIENT QL REPORTED: ABNORMAL
GFR SERPL CREATININE-BSD FRML MDRD: >90 ML/MIN/1.73M2
GLUCOSE BLD-MCNC: 100 MG/DL (ref 70–125)
GLUCOSE UR STRIP-MCNC: NEGATIVE MG/DL
HCT VFR BLD AUTO: 44.1 % (ref 40–53)
HDLC SERPL-MCNC: 46 MG/DL
HGB BLD-MCNC: 14.5 G/DL (ref 13.3–17.7)
HGB UR QL STRIP: NEGATIVE
KETONES UR STRIP-MCNC: NEGATIVE MG/DL
LDLC SERPL CALC-MCNC: 130 MG/DL
LEUKOCYTE ESTERASE UR QL STRIP: NEGATIVE
MCH RBC QN AUTO: 31.7 PG (ref 26.5–33)
MCHC RBC AUTO-ENTMCNC: 32.9 G/DL (ref 31.5–36.5)
MCV RBC AUTO: 96 FL (ref 78–100)
NITRATE UR QL: NEGATIVE
PH UR STRIP: 5.5 [PH] (ref 5–8)
PLATELET # BLD AUTO: 214 10E3/UL (ref 150–450)
POTASSIUM BLD-SCNC: 4.1 MMOL/L (ref 3.5–5)
PROT SERPL-MCNC: 7 G/DL (ref 6–8)
PSA SERPL-MCNC: 3.73 UG/L (ref 0–6.5)
RBC # BLD AUTO: 4.58 10E6/UL (ref 4.4–5.9)
SODIUM SERPL-SCNC: 139 MMOL/L (ref 136–145)
SP GR UR STRIP: 1.02 (ref 1–1.03)
TRIGL SERPL-MCNC: 72 MG/DL
UROBILINOGEN UR STRIP-ACNC: 0.2 E.U./DL
WBC # BLD AUTO: 4.4 10E3/UL (ref 4–11)

## 2022-02-25 PROCEDURE — 80061 LIPID PANEL: CPT | Performed by: INTERNAL MEDICINE

## 2022-02-25 PROCEDURE — 85027 COMPLETE CBC AUTOMATED: CPT | Performed by: INTERNAL MEDICINE

## 2022-02-25 PROCEDURE — 36415 COLL VENOUS BLD VENIPUNCTURE: CPT | Performed by: INTERNAL MEDICINE

## 2022-02-25 PROCEDURE — 81003 URINALYSIS AUTO W/O SCOPE: CPT | Performed by: INTERNAL MEDICINE

## 2022-02-25 PROCEDURE — G0103 PSA SCREENING: HCPCS | Performed by: INTERNAL MEDICINE

## 2022-02-25 PROCEDURE — 69210 REMOVE IMPACTED EAR WAX UNI: CPT | Performed by: INTERNAL MEDICINE

## 2022-02-25 PROCEDURE — 80053 COMPREHEN METABOLIC PANEL: CPT | Performed by: INTERNAL MEDICINE

## 2022-02-25 PROCEDURE — 99397 PER PM REEVAL EST PAT 65+ YR: CPT | Mod: 25 | Performed by: INTERNAL MEDICINE

## 2022-02-25 PROCEDURE — 90662 IIV NO PRSV INCREASED AG IM: CPT | Performed by: INTERNAL MEDICINE

## 2022-02-25 PROCEDURE — G0008 ADMIN INFLUENZA VIRUS VAC: HCPCS | Performed by: INTERNAL MEDICINE

## 2022-02-25 RX ORDER — IMIQUIMOD 12.5 MG/.25G
1 CREAM TOPICAL
COMMUNITY
End: 2024-02-23

## 2022-02-25 RX ORDER — LOSARTAN POTASSIUM 100 MG/1
TABLET ORAL
Qty: 90 TABLET | Refills: 3 | Status: SHIPPED | OUTPATIENT
Start: 2022-02-25 | End: 2023-02-22

## 2022-02-25 RX ORDER — AMLODIPINE BESYLATE 5 MG/1
5 TABLET ORAL DAILY
Qty: 30 TABLET | Refills: 11 | Status: SHIPPED | OUTPATIENT
Start: 2022-02-25 | End: 2023-02-14

## 2022-02-25 ASSESSMENT — ACTIVITIES OF DAILY LIVING (ADL): CURRENT_FUNCTION: NO ASSISTANCE NEEDED

## 2022-02-25 NOTE — PROGRESS NOTES
SUBJECTIVE:   Gasper Andrews is a 77 year old male who presents for Preventive Visit.  Gasper is  and retired.  Reports that he exercises at [employer] 6 days weekly.  No cognitive deficits are noted.  His health risk assessment was reviewed.  He reports health maintenance habits overall are good.    He remains on losartan 100 mg daily for hypertension.  He has no adverse effects from the medication.  He states systolic blood pressures range in the 140s to 150s at home.    He sees Dr. Henry for dermatology.  He has a history of severe actinic keratosis and skin cancers.  He is now using Imoquimod twice weekly on arms.    He would like an influenza vaccine today.  He has had 3 Covid vaccines.  His second vaccine is not on the immunization list.  Was on 3/15/21.    Last colonoscopy was in 2019.  5-year follow-up was advised at that time.      He notes plugging in the right ear.  He does have a history of cerumen impaction      Patient has been advised of split billing requirements and indicates understanding: Yes  Are you in the first 12 months of your Medicare coverage?  No    HPI  Do you feel safe in your environment? Yes    Have you ever done Advance Care Planning? (For example, a Health Directive, POLST, or a discussion with a medical provider or your loved ones about your wishes): Yes, patient states has an Advance Care Planning document and will bring a copy to the clinic.       Fall risk  Fallen 2 or more times in the past year?: No  Any fall with injury in the past year?: No    Cognitive Screening   1) Repeat 3 items (Leader, Season, Table)    2) Clock draw: NORMAL  3) 3 item recall: Recalls 3 objects  Results: NORMAL clock, 1-2 items recalled: COGNITIVE IMPAIRMENT LESS LIKELY    Mini-CogTM Copyright S Monty. Licensed by the author for use in [facility]; reprinted with permission (skylar@.[facility] [place]). All rights reserved.      Do you have sleep apnea, excessive snoring or daytime drowsiness?:  "no    Reviewed and updated as needed this visit by clinical staff   Tobacco  Allergies  Meds              Reviewed and updated as needed this visit by Provider                 Social History     Tobacco Use     Smoking status: Never Smoker     Smokeless tobacco: Never Used   Substance Use Topics     Alcohol use: Yes     If you drink alcohol do you typically have >3 drinks per day or >7 drinks per week? No    Alcohol Use 2/25/2022   Prescreen: >3 drinks/day or >7 drinks/week? No     Current providers sharing in care for this patient include:   Patient Care Team:  Chilango Schrader MD as PCP - General (Internal Medicine)  Chilango Schrader MD as Assigned PCP    The following health maintenance items are reviewed in Epic and correct as of today:  Health Maintenance Due   Topic Date Due     ANNUAL REVIEW OF  ORDERS  Never done     ZOSTER IMMUNIZATION (2 of 3) 11/24/2010     AORTIC ANEURYSM SCREENING (SYSTEM ASSIGNED)  Never done     INFLUENZA VACCINE (1) 09/01/2021     MEDICARE ANNUAL WELLNESS VISIT  11/02/2021     FALL RISK ASSESSMENT  11/02/2021     Lab work is in process          Review of Systems  CONSTITUTIONAL: NEGATIVE for fever, chills, change in weight  INTEGUMENTARY/SKIN: Treated for actinic keratosis on forearms  EYES: NEGATIVE for vision changes or irritation  ENT/MOUTH: Plugging in right ear  RESP: NEGATIVE for significant cough or SOB  BREAST: NEGATIVE for masses, tenderness or discharge  CV: NEGATIVE for chest pain, palpitations or peripheral edema  GI: NEGATIVE for nausea, abdominal pain, heartburn, or change in bowel habits  : NEGATIVE for frequency, dysuria, or hematuria  MUSCULOSKELETAL: NEGATIVE for significant arthralgias or myalgia  NEURO: NEGATIVE for weakness, dizziness or paresthesias  ENDOCRINE: NEGATIVE for temperature intolerance, skin/hair changes  HEME: NEGATIVE for bleeding problems  PSYCHIATRIC: NEGATIVE for changes in mood or affect    OBJECTIVE:   Ht 1.778 m (5' 10\")   Wt 78.5 kg " "(173 lb)   BMI 24.82 kg/m   Estimated body mass index is 24.82 kg/m  as calculated from the following:    Height as of this encounter: 1.778 m (5' 10\").    Weight as of this encounter: 78.5 kg (173 lb).  Physical Exam  /72 (BP Location: Left arm, Patient Position: Sitting, Cuff Size: Adult Small)   Pulse 54   Ht 1.778 m (5' 10\")   Wt 78.5 kg (173 lb)   SpO2 96%   BMI 24.82 kg/m  recheck blood pressure 144/78 right arm, by MD    General Appearance:  Alert, cooperative, no distress, appears stated age   Head:  Normocephalic, without obvious abnormality, atraumatic   Eyes:  PERRL, conjunctiva/corneas clear, EOM's intact   Ears:   Occluded with cerumen, right ear greater than left   Nose: Nares normal, septum midline, mucosa normal, no drainage   Throat: Lips, mucosa, and tongue normal; teeth and gums normal   Neck: Supple, symmetrical, trachea midline, no adenopathy, thyroid: not enlarged, symmetric, no tenderness/mass/nodules, no carotid bruit or JVD   Back:   Symmetric, no curvature, ROM normal, no CVA tenderness   Lungs:   Clear to auscultation bilaterally, respirations unlabored   Chest Wall:  No tenderness or deformity   Heart:  Regular rate and rhythm, S1, S2 normal, no murmur, rub or gallop   Abdomen:   Soft, non-tender, bowel sounds active all four quadrants,  no masses, no organomegaly   Genitalia:   Circumcised.  No testicular masses or inguinal hernias   Rectal:  Normal tone, normal prostate, no masses or tenderness   Extremities: Extremities normal, atraumatic, no cyanosis or edema   Skin:  Erythema and scaling of skin in multiple areas on the forearms.  Also multiple actinic's on the face   Lymph nodes: Cervical and supraclavicular normal   Neurologic: No dysarthria or aphasia.  Cranial nerves, motor or sensory exams intact with symmetric DTRs         Diagnostic Test Results:  Labs reviewed in Epic  Results for orders placed or performed in visit on 02/25/22 (from the past 24 hour(s))   CBC " with platelets   Result Value Ref Range    WBC Count 4.4 4.0 - 11.0 10e3/uL    RBC Count 4.58 4.40 - 5.90 10e6/uL    Hemoglobin 14.5 13.3 - 17.7 g/dL    Hematocrit 44.1 40.0 - 53.0 %    MCV 96 78 - 100 fL    MCH 31.7 26.5 - 33.0 pg    MCHC 32.9 31.5 - 36.5 g/dL    RDW 12.5 10.0 - 15.0 %    Platelet Count 214 150 - 450 10e3/uL   UA Macro with Reflex to Micro and Culture - lab collect    Specimen: Urine, Midstream   Result Value Ref Range    Color Urine Yellow Colorless, Straw, Light Yellow, Yellow    Appearance Urine Clear Clear    Glucose Urine Negative Negative mg/dL    Bilirubin Urine Negative Negative    Ketones Urine Negative Negative mg/dL    Specific Gravity Urine 1.020 1.005 - 1.030    Blood Urine Negative Negative    pH Urine 5.5 5.0 - 8.0    Protein Albumin Urine Negative Negative mg/dL    Urobilinogen Urine 0.2 0.2, 1.0 E.U./dL    Nitrite Urine Negative Negative    Leukocyte Esterase Urine Negative Negative    Narrative    Microscopic not indicated       ASSESSMENT / PLAN:   (Z00.00) Medicare annual wellness visit, subsequent  (primary encounter diagnosis)  Comment: Gasper is  and retired.  He exercises 6 days weekly.  Health maintenance habits are good.  He does have a healthcare directive.  He will receive an influenza vaccine today.  He is up-to-date on other immunizations  Plan: REVIEW OF HEALTH MAINTENANCE PROTOCOL ORDERS        Influenza vaccine today    (I10) Essential hypertension, benign  Comment: Home blood pressure readings have been in the 140s to 150s systolic on losartan 100 mg daily.  Repeat blood pressure by MD also was mildly elevated.  Amlodipine will be added  Plan: losartan (COZAAR) 100 MG tablet, Comprehensive         metabolic panel, UA Macro with Reflex to Micro         and Culture - lab collect, amLODIPine (NORVASC)        5 MG tablet            (E78.00) Hypercholesterolemia  Comment: Has had minor elevations in total cholesterol with no known atherosclerotic disease.  Lipids  "will be checked  Plan: Lipid panel reflex to direct LDL Fasting            (Z12.5) Screening for prostate cancer  Comment: PSA will be checked  Plan: PSA, screen            (Z86.010) History of colonic polyps  Comment: Next colonoscopy is due for follow-up in 2024  Plan: CBC with platelets            (Z23) Need for influenza vaccination  Comment: He will receive an influenza vaccine today  Plan: INFLUENZA, QUAD, HD, PF, 65+ (FLUZONE HD)          (H61.23) Bilateral impacted cerumen  Comment: Ears are irrigated for cerumen impaction  Plan: REMOVE IMPACTED CERUMEN          Patient Instructions   1.  Flu shot today    2.  Irrigate ears for cerumen    3.  Add amlodipine 5 mg daily.  Continue losartan    4. I will notify you of test results.    5.  Report BP readings in one month.    6.  Next colonoscopy due 2024            COUNSELING:  Reviewed preventive health counseling, as reflected in patient instructions       Regular exercise       Healthy diet/nutrition       Colon cancer screening       Prostate cancer screening    Estimated body mass index is 24.82 kg/m  as calculated from the following:    Height as of this encounter: 1.778 m (5' 10\").    Weight as of this encounter: 78.5 kg (173 lb).        He reports that he has never smoked. He has never used smokeless tobacco.      Appropriate preventive services were discussed with this patient, including applicable screening as appropriate for cardiovascular disease, diabetes, osteopenia/osteoporosis, and glaucoma.  As appropriate for age/gender, discussed screening for colorectal cancer, prostate cancer, breast cancer, and cervical cancer. Checklist reviewing preventive services available has been given to the patient.    Reviewed patients plan of care and provided an AVS. The Basic Care Plan (routine screening as documented in Health Maintenance) for Gasper meets the Care Plan requirement. This Care Plan has been established and reviewed with the Patient.    Counseling " Resources:  ATP IV Guidelines  Pooled Cohorts Equation Calculator  Breast Cancer Risk Calculator  Breast Cancer: Medication to Reduce Risk  FRAX Risk Assessment  ICSI Preventive Guidelines  Dietary Guidelines for Americans, 2010  [employer]'s MyPlate  ASA Prophylaxis  Lung CA Screening    Chilango Schrader MD  [facility]    Identified Health Risks:

## 2022-02-25 NOTE — PATIENT INSTRUCTIONS
1.  Flu shot today    2.  Irrigate ears for cerumen    3.  Add amlodipine 5 mg daily.  Continue losartan    4. I will notify you of test results.    5.  Report BP readings in one month.    6.  Next colonoscopy due 2024

## 2022-06-01 ENCOUNTER — NURSE TRIAGE (OUTPATIENT)
Dept: NURSING | Facility: CLINIC | Age: 77
End: 2022-06-01
Payer: COMMERCIAL

## 2022-06-01 NOTE — TELEPHONE ENCOUNTER
Wife calling with questions about antiviral treatment for Covid.     tested positive on 5/30 for Covid, but symptoms began last Friday 5/27. Was told he did not qualify for antiviral treatment because is out of the 5 day window of sx onset.    Wife says he is having a hard time catching his breath while coughing, has a sore throat, gagging from cough, fatigue, body aches. Was rx'd Robitussin AC, but is not taking it because it makes him nauseous.    Pt's wife is very frustrated that it took 2 days for test results to come back and now he is out of the treatment window for Paxlovid. Reviewed when to call 911/go to ED for evaluation if respiratory symptoms worsen. Also discussed care advice for symptom management at home. Wife tearful, but verbalized understanding. Encouraged to call back if further questions.    Gabby Causey, RN, BSN  [facility]   Triage Nurse Advisor    Reason for Disposition    COVID-19 Testing, questions about    Additional Information    Negative: SEVERE difficulty breathing (e.g., struggling for each breath, speaks in single words)    Negative: Difficult to awaken or acting confused (e.g., disoriented, slurred speech)    Negative: Bluish (or gray) lips or face now    Negative: Shock suspected (e.g., cold/pale/clammy skin, too weak to stand, low BP, rapid pulse)    Negative: Sounds like a life-threatening emergency to the triager    Negative: [1] Diagnosed or suspected COVID-19 AND [2] symptoms lasting 3 or more weeks    Negative: [1] COVID-19 exposure AND [2] no symptoms    Negative: COVID-19 vaccine reaction suspected (e.g., fever, headache, muscle aches) occurring 1 to 3 days after getting vaccine    Negative: COVID-19 vaccine, questions about    Negative: [1] Lives with someone known to have influenza (flu test positive) AND [2] flu-like symptoms (e.g., cough, runny nose, sore throat, SOB; with or without fever)    Negative: [1] Adult with possible COVID-19 symptoms AND [2]  triager concerned about severity of symptoms or other causes    Negative: COVID-19 and breastfeeding, questions about    Protocols used: CORONAVIRUS (COVID-19) DIAGNOSED OR [id number] 1.18.2022

## 2022-09-25 ENCOUNTER — HEALTH MAINTENANCE LETTER (OUTPATIENT)
Age: 77
End: 2022-09-25

## 2023-02-14 DIAGNOSIS — I10 ESSENTIAL HYPERTENSION, BENIGN: ICD-10-CM

## 2023-02-16 NOTE — TELEPHONE ENCOUNTER
"Routing refill request to provider for review/approval because:  Patient needs to be seen because it has been more than 1 year since last office visit.    Last Written Prescription Date:  2/25/22  Last Fill Quantity: 30,  # refills: 11   Last office visit provider:  2/25/22     Requested Prescriptions   Pending Prescriptions Disp Refills     amLODIPine (NORVASC) 5 MG tablet 90 tablet 3     Sig: Take 1 tablet (5 mg) by mouth daily       Calcium Channel Blockers Protocol  Failed - 2/14/2023  2:08 PM        Failed - Recent (12 mo) or future (30 days) visit within the authorizing provider's specialty     Patient has had an office visit with the authorizing provider or a provider within the authorizing providers department within the previous 12 mos or has a future within next 30 days. See \"Patient Info\" tab in inbasket, or \"Choose Columns\" in Meds & Orders section of the refill encounter.              Passed - Blood pressure under 140/90 in past 12 months     BP Readings from Last 3 Encounters:   02/25/22 126/72   11/02/20 136/74   02/26/20 128/72                 Passed - Medication is active on med list        Passed - Patient is age 18 or older        Passed - Normal serum creatinine on file in past 12 months     Recent Labs   Lab Test 02/25/22  0904   CR 0.75       Ok to refill medication if creatinine is low               Chelsea Justice RN 02/16/23 1:41 PM  "

## 2023-02-17 RX ORDER — AMLODIPINE BESYLATE 5 MG/1
5 TABLET ORAL DAILY
Qty: 90 TABLET | Refills: 3 | Status: SHIPPED | OUTPATIENT
Start: 2023-02-17 | End: 2024-02-12

## 2023-02-17 NOTE — TELEPHONE ENCOUNTER
02/17 Lm on voicemail that we ok'd his medication 1 time but he is in need of establishing care with a new provider who is taking new pt's it has been over 1 year Dr. Schrader is now retired.

## 2023-02-20 DIAGNOSIS — I10 ESSENTIAL HYPERTENSION, BENIGN: ICD-10-CM

## 2023-02-20 RX ORDER — LOSARTAN POTASSIUM 100 MG/1
TABLET ORAL
Qty: 90 TABLET | Refills: 3 | OUTPATIENT
Start: 2023-02-20

## 2023-02-20 NOTE — TELEPHONE ENCOUNTER
"Routing refill request to provider for review/approval because:  Patient needs to be seen because it has been more than 1 year since last office visit.    Former patient of Barbara & has not established care with another provider.  Please assign refill request to covering provider per clinic standard process.        Last Written Prescription Date: 2/25/22  Last Fill Quantity:90  # refills: 3   Last office visit provider: 2/25/22    Requested Prescriptions   Pending Prescriptions Disp Refills     losartan (COZAAR) 100 MG tablet 90 tablet 3     Sig: [LOSARTAN (COZAAR) 100 MG TABLET] TAKE ONE TABLET BY MOUTH EVERY DAY       Angiotensin-II Receptors Failed - 2/20/2023 11:34 AM        Failed - Recent (12 mo) or future (30 days) visit within the authorizing provider's specialty     Patient has had an office visit with the authorizing provider or a provider within the authorizing providers department within the previous 12 mos or has a future within next 30 days. See \"Patient Info\" tab in inbasket, or \"Choose Columns\" in Meds & Orders section of the refill encounter.              Passed - Last blood pressure under 140/90 in past 12 months     BP Readings from Last 3 Encounters:   02/25/22 126/72   11/02/20 136/74   02/26/20 128/72                 Passed - Medication is active on med list        Passed - Patient is age 18 or older        Passed - Normal serum creatinine on file in past 12 months     Recent Labs   Lab Test 02/25/22  0904   CR 0.75       Ok to refill medication if creatinine is low          Passed - Normal serum potassium on file in past 12 months     Recent Labs   Lab Test 02/25/22  0904   POTASSIUM 4.1                         Remi Melara RN 02/20/23 11:41 AM  "

## 2023-02-22 ENCOUNTER — OFFICE VISIT (OUTPATIENT)
Dept: INTERNAL MEDICINE | Facility: CLINIC | Age: 78
End: 2023-02-22
Payer: COMMERCIAL

## 2023-02-22 VITALS
HEART RATE: 59 BPM | WEIGHT: 172 LBS | DIASTOLIC BLOOD PRESSURE: 72 MMHG | BODY MASS INDEX: 24.62 KG/M2 | TEMPERATURE: 97.8 F | HEIGHT: 70 IN | SYSTOLIC BLOOD PRESSURE: 124 MMHG | OXYGEN SATURATION: 95 %

## 2023-02-22 DIAGNOSIS — I10 ESSENTIAL HYPERTENSION, BENIGN: ICD-10-CM

## 2023-02-22 DIAGNOSIS — Z12.5 SCREENING FOR PROSTATE CANCER: ICD-10-CM

## 2023-02-22 DIAGNOSIS — Z85.828 HISTORY OF SKIN CANCER: ICD-10-CM

## 2023-02-22 DIAGNOSIS — E78.00 HYPERCHOLESTEROLEMIA: ICD-10-CM

## 2023-02-22 DIAGNOSIS — Z00.00 ANNUAL PHYSICAL EXAM: Primary | ICD-10-CM

## 2023-02-22 DIAGNOSIS — Z86.0100 HISTORY OF COLONIC POLYPS: ICD-10-CM

## 2023-02-22 LAB
ALBUMIN SERPL BCG-MCNC: 4.2 G/DL (ref 3.5–5.2)
ALBUMIN UR-MCNC: NEGATIVE MG/DL
ALP SERPL-CCNC: 52 U/L (ref 40–129)
ALT SERPL W P-5'-P-CCNC: 16 U/L (ref 10–50)
ANION GAP SERPL CALCULATED.3IONS-SCNC: 11 MMOL/L (ref 7–15)
APPEARANCE UR: CLEAR
AST SERPL W P-5'-P-CCNC: 19 U/L (ref 10–50)
BILIRUB SERPL-MCNC: 0.7 MG/DL
BILIRUB UR QL STRIP: NEGATIVE
BUN SERPL-MCNC: 14 MG/DL (ref 8–23)
CALCIUM SERPL-MCNC: 9.2 MG/DL (ref 8.8–10.2)
CHLORIDE SERPL-SCNC: 104 MMOL/L (ref 98–107)
CHOLEST SERPL-MCNC: 211 MG/DL
COLOR UR AUTO: YELLOW
CREAT SERPL-MCNC: 0.8 MG/DL (ref 0.67–1.17)
DEPRECATED HCO3 PLAS-SCNC: 26 MMOL/L (ref 22–29)
ERYTHROCYTE [DISTWIDTH] IN BLOOD BY AUTOMATED COUNT: 12.4 % (ref 10–15)
GFR SERPL CREATININE-BSD FRML MDRD: >90 ML/MIN/1.73M2
GLUCOSE SERPL-MCNC: 101 MG/DL (ref 70–99)
GLUCOSE UR STRIP-MCNC: NEGATIVE MG/DL
HCT VFR BLD AUTO: 43.4 % (ref 40–53)
HDLC SERPL-MCNC: 51 MG/DL
HGB BLD-MCNC: 14.6 G/DL (ref 13.3–17.7)
HGB UR QL STRIP: NEGATIVE
KETONES UR STRIP-MCNC: NEGATIVE MG/DL
LDLC SERPL CALC-MCNC: 143 MG/DL
LEUKOCYTE ESTERASE UR QL STRIP: NEGATIVE
MCH RBC QN AUTO: 31.8 PG (ref 26.5–33)
MCHC RBC AUTO-ENTMCNC: 33.6 G/DL (ref 31.5–36.5)
MCV RBC AUTO: 95 FL (ref 78–100)
NITRATE UR QL: NEGATIVE
NONHDLC SERPL-MCNC: 160 MG/DL
PH UR STRIP: 5.5 [PH] (ref 5–8)
PLATELET # BLD AUTO: 204 10E3/UL (ref 150–450)
POTASSIUM SERPL-SCNC: 4 MMOL/L (ref 3.4–5.3)
PROT SERPL-MCNC: 7 G/DL (ref 6.4–8.3)
PSA SERPL-MCNC: 2.57 NG/ML (ref 0–6.5)
RBC # BLD AUTO: 4.59 10E6/UL (ref 4.4–5.9)
SODIUM SERPL-SCNC: 141 MMOL/L (ref 136–145)
SP GR UR STRIP: 1.02 (ref 1–1.03)
TRIGL SERPL-MCNC: 86 MG/DL
TSH SERPL DL<=0.005 MIU/L-ACNC: 0.94 UIU/ML (ref 0.3–4.2)
UROBILINOGEN UR STRIP-ACNC: 0.2 E.U./DL
WBC # BLD AUTO: 6.5 10E3/UL (ref 4–11)

## 2023-02-22 PROCEDURE — 99214 OFFICE O/P EST MOD 30 MIN: CPT | Mod: 25 | Performed by: INTERNAL MEDICINE

## 2023-02-22 PROCEDURE — G0439 PPPS, SUBSEQ VISIT: HCPCS | Performed by: INTERNAL MEDICINE

## 2023-02-22 PROCEDURE — G0103 PSA SCREENING: HCPCS | Performed by: INTERNAL MEDICINE

## 2023-02-22 PROCEDURE — 85027 COMPLETE CBC AUTOMATED: CPT | Performed by: INTERNAL MEDICINE

## 2023-02-22 PROCEDURE — G0008 ADMIN INFLUENZA VIRUS VAC: HCPCS | Performed by: INTERNAL MEDICINE

## 2023-02-22 PROCEDURE — 84443 ASSAY THYROID STIM HORMONE: CPT | Performed by: INTERNAL MEDICINE

## 2023-02-22 PROCEDURE — 90662 IIV NO PRSV INCREASED AG IM: CPT | Performed by: INTERNAL MEDICINE

## 2023-02-22 PROCEDURE — 80053 COMPREHEN METABOLIC PANEL: CPT | Performed by: INTERNAL MEDICINE

## 2023-02-22 PROCEDURE — 80061 LIPID PANEL: CPT | Performed by: INTERNAL MEDICINE

## 2023-02-22 PROCEDURE — 81003 URINALYSIS AUTO W/O SCOPE: CPT | Performed by: INTERNAL MEDICINE

## 2023-02-22 PROCEDURE — 36415 COLL VENOUS BLD VENIPUNCTURE: CPT | Performed by: INTERNAL MEDICINE

## 2023-02-22 RX ORDER — LOSARTAN POTASSIUM 100 MG/1
TABLET ORAL
Qty: 90 TABLET | Refills: 4 | Status: SHIPPED | OUTPATIENT
Start: 2023-02-22 | End: 2024-02-23

## 2023-02-22 NOTE — PROGRESS NOTES
SUBJECTIVE:   Gasper is a 78 year old who presents for Preventive Visit.  Patient has been advised of split billing requirements and indicates understanding: YesAre you in the first 12 months of your Medicare coverage?  No    HPI    Have you ever done Advance Care Planning? (For example, a Health Directive, POLST, or a discussion with a medical provider or your loved ones about your wishes): Yes, advance care planning is on file.    Ability to successfully perform ADLs: yes  Hearing impairment: no  Home Safety: yes  Fall Risk:   Fall risk  Fallen 2 or more times in the past year?: No  Any fall with injury in the past year?: No    Cognitive Screening   1) Repeat 3 items (Leader, Season, Table)    2) Clock draw: NORMAL  3) 3 item recall: Recalls 3 objects  Results: NORMAL clock, 1-2 items recalled: COGNITIVE IMPAIRMENT LESS LIKELY    Mini-CogTM Copyright S Monty. Licensed by the author for use in [facility]; reprinted with permission (soob@[facility]). All rights reserved.      Do you have sleep apnea, excessive snoring or daytime drowsiness?: no    Reviewed and updated as needed this visit by clinical staff   Tobacco  Allergies     Surg Hx  Fam Hx          Reviewed and updated as needed this visit by Provider        Surg Hx  Fam Hx         Social History     Tobacco Use     Smoking status: Never     Smokeless tobacco: Never   Substance Use Topics     Alcohol use: Yes     Comment: 2         Alcohol Use 2/25/2022   Prescreen: >3 drinks/day or >7 drinks/week? No   Prescreen: >3 drinks/day or >7 drinks/week? -     Current providers sharing in care for this patient include:   Patient Care Team:  Remi Barrientos MD as PCP - General (Internal Medicine)  Chilango Schrader MD as Assigned PCP    The following health maintenance items are reviewed in Epic and correct as of today:  Health Maintenance   Topic Date Due     ZOSTER IMMUNIZATION (2 of 3) 11/24/2010     COVID-19 Vaccine (3 - Booster for Pfizer  "series) 01/14/2022     INFLUENZA VACCINE (1) 09/01/2022     MEDICARE ANNUAL WELLNESS VISIT  02/25/2023     ANNUAL REVIEW OF HM ORDERS  02/22/2024     FALL RISK ASSESSMENT  02/22/2024     DTAP/TDAP/TD IMMUNIZATION (2 - Td or Tdap) 07/07/2025     LIPID  02/25/2027     ADVANCE CARE PLANNING  02/25/2027     COLORECTAL CANCER SCREENING  03/13/2029     HEPATITIS C SCREENING  Completed     PHQ-2 (once per calendar year)  Completed     Pneumococcal Vaccine: 65+ Years  Completed     IPV IMMUNIZATION  Aged Out     MENINGITIS IMMUNIZATION  Aged Out       Review of Systems  Constitutional, HEENT, cardiovascular, pulmonary, GI, , musculoskeletal, neuro, skin, endocrine and psych systems are negative, except as otherwise noted.    OBJECTIVE:   /72 (BP Location: Left arm, Patient Position: Sitting, Cuff Size: Adult Regular)   Pulse 59   Temp 97.8  F (36.6  C)   Ht 1.778 m (5' 10\")   Wt 78 kg (172 lb)   SpO2 95%   BMI 24.68 kg/m   Estimated body mass index is 24.68 kg/m  as calculated from the following:    Height as of this encounter: 1.778 m (5' 10\").    Weight as of this encounter: 78 kg (172 lb).  Physical Exam  EYES: Eyelids, conjunctiva, and sclera were normal. Pupils were normal. Cornea, iris, and lens were normal bilaterally.  HEAD, EARS, NOSE, MOUTH, AND THROAT: Head and face were normal. Hearing was normal to voice and the ears were normal to external exam.  NECK: Neck appearance was normal. There were no neck masses and the thyroid was not enlarged.  RESPIRATORY: Breathing pattern was normal and the chest moved symmetrically.  Percussion/auscultatory percussion was normal.  Lung sounds were normal and there were no abnormal sounds.  CARDIOVASCULAR: Heart rate and rhythm were normal.  S1 and S2 were normal and there were no extra sounds or murmurs. Peripheral pulses in arms and legs were normal.  Jugular venous pressure was normal.  There was no peripheral edema.  GASTROINTESTINAL: The abdomen was normal in " contour.  Bowel sounds were present.  Percussion detected no organ enlargement or tenderness.  Palpation detected no tenderness, mass, or enlarged organs.   MUSCULOSKELETAL: Skeletal configuration was normal and muscle mass was normal for age. Joint appearance was overall normal.  LYMPHATIC: There were no enlarged nodes.  SKIN/HAIR/NAILS: Skin color was normal.  There were no skin lesions.  Hair and nails were normal.  NEUROLOGIC: The patient was alert and oriented to person, place, time, and circumstance. Speech was normal. Cranial nerves were normal. Motor strength was normal for age. The patient was normally coordinated.  PSYCHIATRIC:  Mood and affect were normal and the patient had normal recent and remote memory. The patient's judgment and insight were normal.    ASSESSMENT / PLAN:   1. Annual physical exam  This is a generally healthy 78-year-old man with issues as discussed below    2. Screening for prostate cancer  - Prostate Specific Antigen Screen; Future  - Prostate Specific Antigen Screen    3. Essential hypertension, benign  Blood pressure control continue current medication  - losartan (COZAAR) 100 MG tablet; [LOSARTAN (COZAAR) 100 MG TABLET] TAKE ONE TABLET BY MOUTH EVERY DAY  Dispense: 90 tablet; Refill: 4  - CBC with platelets; Future  - Comprehensive metabolic panel; Future  - CBC with platelets  - Comprehensive metabolic panel    4. History of colonic polyps  Colonoscopy per routine    5. History of skin cancer - Dr. Henry    6. Hypercholesterolemia  Discussed at length use of statin medication.  He declines at this time.  We discussed further restratification with a CT coronary calcium scan which he also declines at this time  - Lipid panel reflex to direct LDL Fasting; Future  - TSH with free T4 reflex; Future  - UA reflex to Microscopic and Culture; Future  - Lipid panel reflex to direct LDL Fasting  - TSH with free T4 reflex  - UA reflex to Microscopic and Culture    COUNSELING:  Reviewed  preventive health counseling, as reflected in patient instructions        He reports that he has never smoked. He has never used smokeless tobacco.      Appropriate preventive services were discussed with this patient, including applicable screening as appropriate for cardiovascular disease, diabetes, osteopenia/osteoporosis, and glaucoma.  As appropriate for age/gender, discussed screening for colorectal cancer, prostate cancer, breast cancer, and cervical cancer. Checklist reviewing preventive services available has been given to the patient.    Reviewed patients plan of care and provided an AVS. The Basic Care Plan (routine screening as documented in Health Maintenance) for Gasper meets the Care Plan requirement. This Care Plan has been established and reviewed with the Patient.    Remi Barrientos MD  [facility]    Identified Health Risks:

## 2023-11-28 ENCOUNTER — MYC MEDICAL ADVICE (OUTPATIENT)
Dept: INTERNAL MEDICINE | Facility: CLINIC | Age: 78
End: 2023-11-28
Payer: COMMERCIAL

## 2023-11-28 DIAGNOSIS — Z12.11 COLON CANCER SCREENING: Primary | ICD-10-CM

## 2023-11-28 NOTE — TELEPHONE ENCOUNTER
11/2/2020 Visit note    . Benign adenomatous polyp of the large intestine  Last colonoscopy was in 3/19. 5-year follow-up was advised at that time.

## 2023-12-11 ENCOUNTER — NURSE TRIAGE (OUTPATIENT)
Dept: NURSING | Facility: CLINIC | Age: 78
End: 2023-12-11
Payer: COMMERCIAL

## 2023-12-11 NOTE — TELEPHONE ENCOUNTER
Nurse Triage SBAR    Is this a 2nd Level Triage? NO    Situation: Ear wax    Background: Patient states that he is going on a trip next week and he needs to have some ear wax flushed from his right ear.  He states that his hearing is a bit decreased.  He denies pain, denies any drainage.      Assessment: Right ear wax     Protocol Recommended Disposition:   See in Office Within 3 Days    Recommendation: Patient was warm transferred to scheduling for an appointment. If none, he will go to urgent care. Patient verbalized understanding and agreement with the plan of care.      N/A    MARY CHAVIRA RN    Does the patient meet one of the following criteria for ADS visit consideration? No    Reason for Disposition   Patient wants to be seen    Additional Information   Negative: Ear injury is main concern   Negative: Injury to ear canal from cotton swab (e.g., Q-tip) or doctor's ear exam   Negative: Foreign body stuck in the ear (e.g., bug, piece of cotton)   Negative: Sudden onset of hearing loss   Negative: Dizziness is main symptom   Negative: Pain or discomfort in or around ear is main symptom   Negative: Patient sounds very sick or weak to the triager   Negative: Sudden onset of ear pain or bleeding after long, thin object was inserted into the ear canal (e.g., pencil, Q-tip)   Negative: Ear pain after ear syringing (i.e., squirting liquid into ear canal to remove wax) and severe   Negative: Ear pain after ear syringing (i.e., squirting liquid into ear canal to remove wax) and lasts > 1 hour   Negative: Walking is very unsteady or feels very dizzy  (Exception: Brief dizziness that occurs with ear syringing.)   Negative: Redness or swelling of outer ear (ear lobe)   Negative: Pus from the ear canal (e.g., yellow or green discharge)   Negative: History of ear drum perforation, tubes or ear surgery   Negative: Complete hearing loss in either ear   Negative: Diabetes mellitus    Protocols used: Earwax-A-OH

## 2023-12-14 ENCOUNTER — OFFICE VISIT (OUTPATIENT)
Dept: FAMILY MEDICINE | Facility: CLINIC | Age: 78
End: 2023-12-14
Payer: COMMERCIAL

## 2023-12-14 VITALS
BODY MASS INDEX: 24.77 KG/M2 | TEMPERATURE: 97.2 F | SYSTOLIC BLOOD PRESSURE: 128 MMHG | HEART RATE: 60 BPM | RESPIRATION RATE: 20 BRPM | DIASTOLIC BLOOD PRESSURE: 72 MMHG | OXYGEN SATURATION: 99 % | HEIGHT: 70 IN | WEIGHT: 173 LBS

## 2023-12-14 DIAGNOSIS — H61.23 BILATERAL IMPACTED CERUMEN: Primary | ICD-10-CM

## 2023-12-14 PROCEDURE — 69209 REMOVE IMPACTED EAR WAX UNI: CPT | Mod: 50 | Performed by: FAMILY MEDICINE

## 2023-12-14 RX ORDER — RESPIRATORY SYNCYTIAL VIRUS VACCINE 120MCG/0.5
0.5 KIT INTRAMUSCULAR ONCE
Qty: 1 EACH | Refills: 0 | Status: CANCELLED | OUTPATIENT
Start: 2023-12-14 | End: 2023-12-14

## 2023-12-14 NOTE — PROGRESS NOTES
"  Assessment & Plan     Bilateral impacted cerumen  Cleared with water lavage as noted below.  Can return intermittently as needed.    Vaccines discussed.  COVID, flu, RSV, zoster due.  He declined for now.  Discussed that he can get them at the pharmacy at any time.    Sterling Hidalgo MD  [facility]    Hong Jackman is a 78 year old, presenting for the following health issues:  Cerumen Impaction (Right ear)      12/14/2023     3:33 PM   Additional Questions   Roomed by Jo Ann   Accompanied by self       History of Present Illness       Reason for visit:  Right ear plugged  Symptom onset:  3-7 days ago  Symptoms include:  Hearing loss  Symptom intensity:  Mild  Symptom progression:  Improving  Had these symptoms before:  Yes  Has tried/received treatment for these symptoms:  Yes  Previous treatment was successful:  Yes  Prior treatment description:  Wax RX  What makes it worse:  No  What makes it better:  Cleaning the ear canal    He eats 0-1 servings of fruits and vegetables daily.He consumes 0 sweetened beverage(s) daily.He exercises with enough effort to increase his heart rate 20 to 29 minutes per day.  He exercises with enough effort to increase his heart rate 6 days per week.   He is taking medications regularly.     78-year-old man here with the creased hearing and concerns that his ears are plugged with cerumen.  He has had this happen before.  He tried some over-the-counter products but does not think they were successful.  No pain.  No otorrhea.        Objective    /72 (BP Location: Right arm, Patient Position: Sitting, Cuff Size: Adult Regular)   Pulse 60   Temp 97.2  F (36.2  C) (Temporal)   Resp 20   Ht 1.778 m (5' 10\")   Wt 78.5 kg (173 lb)   SpO2 99%   BMI 24.82 kg/m    Body mass index is 24.82 kg/m .  Physical Exam   GENERAL: alert, not distressed  EARS: Initially, EAC is completely occluded with cerumen.  Staff able to clear with relative ease with water " lavage.  After, normal tympanic membranes and external auditory canals bilaterally  PHARYNX: no erythema or exudates  MOUTH: well hydrated mucosa, no lesions    Prior to immunization administration, verified patients identity using patient s name and date of birth. Please see Immunization Activity for additional information.     Screening Questionnaire for Adult Immunization    Are you sick today?   No   Do you have allergies to medications, food, a vaccine component or latex?   No   Have you ever had a serious reaction after receiving a vaccination?   No   Do you have a long-term health problem with heart, lung, kidney, or metabolic disease (e.g., diabetes), asthma, a blood disorder, no spleen, complement component deficiency, a cochlear implant, or a spinal fluid leak?  Are you on long-term aspirin therapy?   No   Do you have cancer, leukemia, HIV/AIDS, or any other immune system problem?   No   Do you have a parent, brother, or sister with an immune system problem?   No   In the past 3 months, have you taken medications that affect  your immune system, such as prednisone, other steroids, or anticancer drugs; drugs for the treatment of rheumatoid arthritis, Crohn s disease, or psoriasis; or have you had radiation treatments?   No   Have you had a seizure, or a brain or other nervous system problem?   No   During the past year, have you received a transfusion of blood or blood    products, or been given immune (gamma) globulin or antiviral drug?   No   For women: Are you pregnant or is there a chance you could become       pregnant during the next month?   No   Have you received any vaccinations in the past 4 weeks?   No     Immunization questionnaire answers were all negative.      Patient instructed to remain in clinic for 15 minutes afterwards, and to report any adverse reactions.     Screening performed by Jo Ann Estrada MA on 12/14/2023 at 3:35 PM.

## 2024-02-11 DIAGNOSIS — I10 ESSENTIAL HYPERTENSION, BENIGN: ICD-10-CM

## 2024-02-12 RX ORDER — AMLODIPINE BESYLATE 5 MG/1
5 TABLET ORAL DAILY
Qty: 90 TABLET | Refills: 0 | Status: SHIPPED | OUTPATIENT
Start: 2024-02-12 | End: 2024-02-23

## 2024-02-16 SDOH — HEALTH STABILITY: PHYSICAL HEALTH: ON AVERAGE, HOW MANY DAYS PER WEEK DO YOU ENGAGE IN MODERATE TO STRENUOUS EXERCISE (LIKE A BRISK WALK)?: 6 DAYS

## 2024-02-16 SDOH — HEALTH STABILITY: PHYSICAL HEALTH: ON AVERAGE, HOW MANY MINUTES DO YOU ENGAGE IN EXERCISE AT THIS LEVEL?: 50 MIN

## 2024-02-16 ASSESSMENT — SOCIAL DETERMINANTS OF HEALTH (SDOH): HOW OFTEN DO YOU GET TOGETHER WITH FRIENDS OR RELATIVES?: ONCE A WEEK

## 2024-02-22 PROBLEM — C44.91 BASAL CELL CARCINOMA OF SKIN: Status: ACTIVE | Noted: 2022-06-01

## 2024-02-22 PROBLEM — D12.6 BENIGN NEOPLASM OF COLON: Status: ACTIVE | Noted: 2022-06-01

## 2024-02-22 RX ORDER — AMMONIUM LACTATE 12 G/100G
LOTION TOPICAL
COMMUNITY
Start: 2024-02-01 | End: 2024-02-23

## 2024-02-23 ENCOUNTER — OFFICE VISIT (OUTPATIENT)
Dept: FAMILY MEDICINE | Facility: CLINIC | Age: 79
End: 2024-02-23
Payer: COMMERCIAL

## 2024-02-23 VITALS
SYSTOLIC BLOOD PRESSURE: 128 MMHG | WEIGHT: 171 LBS | OXYGEN SATURATION: 97 % | BODY MASS INDEX: 24.48 KG/M2 | RESPIRATION RATE: 14 BRPM | HEIGHT: 70 IN | DIASTOLIC BLOOD PRESSURE: 72 MMHG | TEMPERATURE: 97.5 F | HEART RATE: 59 BPM

## 2024-02-23 DIAGNOSIS — I10 ESSENTIAL HYPERTENSION, BENIGN: ICD-10-CM

## 2024-02-23 DIAGNOSIS — Z79.899 MEDICATION MANAGEMENT: ICD-10-CM

## 2024-02-23 DIAGNOSIS — E78.00 HYPERCHOLESTEROLEMIA: ICD-10-CM

## 2024-02-23 DIAGNOSIS — Z00.00 ENCOUNTER FOR MEDICARE ANNUAL WELLNESS EXAM: Primary | ICD-10-CM

## 2024-02-23 DIAGNOSIS — Z12.5 PROSTATE CANCER SCREENING: ICD-10-CM

## 2024-02-23 LAB
ALBUMIN SERPL BCG-MCNC: 4.3 G/DL (ref 3.5–5.2)
ALBUMIN UR-MCNC: NEGATIVE MG/DL
ALP SERPL-CCNC: 48 U/L (ref 40–150)
ALT SERPL W P-5'-P-CCNC: 12 U/L (ref 0–70)
ANION GAP SERPL CALCULATED.3IONS-SCNC: 8 MMOL/L (ref 7–15)
APPEARANCE UR: CLEAR
AST SERPL W P-5'-P-CCNC: 17 U/L (ref 0–45)
BILIRUB SERPL-MCNC: 0.6 MG/DL
BILIRUB UR QL STRIP: NEGATIVE
BUN SERPL-MCNC: 16.1 MG/DL (ref 8–23)
CALCIUM SERPL-MCNC: 8.8 MG/DL (ref 8.8–10.2)
CHLORIDE SERPL-SCNC: 104 MMOL/L (ref 98–107)
CHOLEST SERPL-MCNC: 216 MG/DL
COLOR UR AUTO: YELLOW
CREAT SERPL-MCNC: 0.84 MG/DL (ref 0.67–1.17)
DEPRECATED HCO3 PLAS-SCNC: 26 MMOL/L (ref 22–29)
EGFRCR SERPLBLD CKD-EPI 2021: 90 ML/MIN/1.73M2
ERYTHROCYTE [DISTWIDTH] IN BLOOD BY AUTOMATED COUNT: 12.5 % (ref 10–15)
FASTING STATUS PATIENT QL REPORTED: YES
GLUCOSE SERPL-MCNC: 101 MG/DL (ref 70–99)
GLUCOSE UR STRIP-MCNC: NEGATIVE MG/DL
HCT VFR BLD AUTO: 42 % (ref 40–53)
HDLC SERPL-MCNC: 53 MG/DL
HGB BLD-MCNC: 14.2 G/DL (ref 13.3–17.7)
HGB UR QL STRIP: NEGATIVE
KETONES UR STRIP-MCNC: NEGATIVE MG/DL
LDLC SERPL CALC-MCNC: 152 MG/DL
LEUKOCYTE ESTERASE UR QL STRIP: NEGATIVE
MCH RBC QN AUTO: 32.5 PG (ref 26.5–33)
MCHC RBC AUTO-ENTMCNC: 33.8 G/DL (ref 31.5–36.5)
MCV RBC AUTO: 96 FL (ref 78–100)
NITRATE UR QL: NEGATIVE
NONHDLC SERPL-MCNC: 163 MG/DL
PH UR STRIP: 5 [PH] (ref 5–8)
PLATELET # BLD AUTO: 201 10E3/UL (ref 150–450)
POTASSIUM SERPL-SCNC: 4.3 MMOL/L (ref 3.4–5.3)
PROT SERPL-MCNC: 7.2 G/DL (ref 6.4–8.3)
PSA SERPL DL<=0.01 NG/ML-MCNC: 2.22 NG/ML (ref 0–6.5)
RBC # BLD AUTO: 4.37 10E6/UL (ref 4.4–5.9)
SODIUM SERPL-SCNC: 138 MMOL/L (ref 135–145)
SP GR UR STRIP: >=1.03 (ref 1–1.03)
TRIGL SERPL-MCNC: 57 MG/DL
TSH SERPL DL<=0.005 MIU/L-ACNC: 1.06 UIU/ML (ref 0.3–4.2)
UROBILINOGEN UR STRIP-ACNC: 0.2 E.U./DL
WBC # BLD AUTO: 4.9 10E3/UL (ref 4–11)

## 2024-02-23 PROCEDURE — 80061 LIPID PANEL: CPT | Performed by: NURSE PRACTITIONER

## 2024-02-23 PROCEDURE — 80053 COMPREHEN METABOLIC PANEL: CPT | Performed by: NURSE PRACTITIONER

## 2024-02-23 PROCEDURE — 36415 COLL VENOUS BLD VENIPUNCTURE: CPT | Performed by: NURSE PRACTITIONER

## 2024-02-23 PROCEDURE — 81003 URINALYSIS AUTO W/O SCOPE: CPT | Performed by: NURSE PRACTITIONER

## 2024-02-23 PROCEDURE — 84443 ASSAY THYROID STIM HORMONE: CPT | Performed by: NURSE PRACTITIONER

## 2024-02-23 PROCEDURE — G0103 PSA SCREENING: HCPCS | Performed by: NURSE PRACTITIONER

## 2024-02-23 PROCEDURE — 99214 OFFICE O/P EST MOD 30 MIN: CPT | Mod: 25 | Performed by: NURSE PRACTITIONER

## 2024-02-23 PROCEDURE — 85027 COMPLETE CBC AUTOMATED: CPT | Performed by: NURSE PRACTITIONER

## 2024-02-23 PROCEDURE — G0439 PPPS, SUBSEQ VISIT: HCPCS | Performed by: NURSE PRACTITIONER

## 2024-02-23 RX ORDER — AMLODIPINE BESYLATE 5 MG/1
5 TABLET ORAL DAILY
Qty: 90 TABLET | Refills: 3 | Status: SHIPPED | OUTPATIENT
Start: 2024-02-23

## 2024-02-23 RX ORDER — LOSARTAN POTASSIUM 100 MG/1
TABLET ORAL
Qty: 90 TABLET | Refills: 3 | Status: SHIPPED | OUTPATIENT
Start: 2024-02-23

## 2024-02-23 ASSESSMENT — PAIN SCALES - GENERAL: PAINLEVEL: NO PAIN (0)

## 2024-02-23 NOTE — PROGRESS NOTES
Preventive Care Visit  [facility]  YASMEEN Tabor CNP, Family Medicine  Feb 23, 2024    Encounter for Medicare annual wellness exam  Recommend consuming a healthy diet and exercising.  He declines vaccines today.  He is up-to-date on colorectal cancer screening.  - CBC with platelets  - TSH with free T4 reflex  - UA Macroscopic with reflex to Microscopic and Culture  - CBC with platelets  - TSH with free T4 reflex  - UA Macroscopic with reflex to Microscopic and Culture    Prostate cancer screening  - Prostate Specific Antigen Screen  - Prostate Specific Antigen Screen    Essential hypertension, benign  This is controlled.  He continues amlodipine and losartan.  - amLODIPine (NORVASC) 5 MG tablet  Dispense: 90 tablet; Refill: 3  - losartan (COZAAR) 100 MG tablet  Dispense: 90 tablet; Refill: 3    Hypercholesterolemia  He is not currently taking medication.  Will notify patient of results.  If elevated, may consider cardiac calcium score for risk stratification.  - Lipid panel reflex to direct LDL Fasting  - Lipid panel reflex to direct LDL Fasting    Medication management  - Comprehensive metabolic panel  - Comprehensive metabolic panel        Hong Jackman is a 79 year old, presenting for the following:  Wellness Visit    Patient has been  for 50 years.  He has 2 children and 2 grandchildren.  He is consuming a healthy diet.  He exercises at [employer] 6 days/week.  He completes 3 spinning classes per week.  He also bikes outside during the summer.  He golfs.  He has hypertension and is taking losartan 100 mg daily and amlodipine 5 mg daily.  He is tolerating the medication well without any side effects.  He denies headaches, blurry vision, chest pain, shortness of breath with exertion, edema, orthopnea, syncope.  He has a history of hyperlipidemia and is not currently taking medication.  Last cholesterol check was on 2/22/2023 with a total cholesterol 211, triglycerides  86, HDL 51, .        2/23/2024    10:04 AM   Additional Questions   Roomed by Elizabeth         Health Care Directive  Patient does not have a Health Care Directive or Living Will: Patient states has Advance Directive and will bring in a copy to clinic.    HPI          2/16/2024   General Health   How would you rate your overall physical health? Excellent   Feel stress (tense, anxious, or unable to sleep) Not at all         2/16/2024   Nutrition   Diet: Regular (no restrictions)         2/16/2024   Exercise   Days per week of moderate/strenous exercise 6 days   Average minutes spent exercising at this level 50 min         2/16/2024   Social Factors   Frequency of gathering with friends or relatives Once a week   Worry food won't last until get money to buy more No   Food not last or not have enough money for food? No   Do you have housing?  Yes   Are you worried about losing your housing? No   Lack of transportation? No   Unable to get utilities (heat,electricity)? No         2/23/2024   Fall Risk   Fallen 2 or more times in the past year? No   Trouble with walking or balance? No          2/16/2024   Activities of Daily Living- Home Safety   Needs help with the following daily activites None of the above   Safety concerns in the home None of the above         2/16/2024   Dental   Dentist two times every year? Yes         2/16/2024   Hearing Screening   Hearing concerns? None of the above         2/16/2024   Driving Risk Screening   Patient/family members have concerns about driving No         2/16/2024   General Alertness/Fatigue Screening   Have you been more tired than usual lately? No         2/16/2024   Urinary Incontinence Screening   Bothered by leaking urine in past 6 months No         2/16/2024   TB Screening   Were you born outside of [place]?  No         Today's PHQ-2 Score:       2/23/2024     9:58 AM   PHQ-2 ( 1999 [employer])   Q1: Little interest or pleasure in doing things 0   Q2: Feeling down,  depressed or hopeless 0   PHQ-2 Score 0   Q1: Little interest or pleasure in doing things Not at all   Q2: Feeling down, depressed or hopeless Not at all   PHQ-2 Score 0           2/16/2024   Substance Use   Alcohol more than 3/day or more than 7/wk No   Do you have a current opioid prescription? No   How severe/bad is pain from 1 to 10? 1/10   Do you use any other substances recreationally? No     Social History     Tobacco Use    Smoking status: Never     Passive exposure: Never    Smokeless tobacco: Never   Vaping Use    Vaping Use: Never used   Substance Use Topics    Alcohol use: Yes     Comment: 2    Drug use: No       ASCVD Risk   The 10-year ASCVD risk score (Alfie WOODS, et al., 2019) is: 32%    Values used to calculate the score:      Age: 79 years      Sex: Male      Is Non- : No      Diabetic: No      Tobacco smoker: No      Systolic Blood Pressure: 128 mmHg      Is BP treated: Yes      HDL Cholesterol: 51 mg/dL      Total Cholesterol: 211 mg/dL            Reviewed and updated as needed this visit by Provider                    Current providers sharing in care for this patient include:  Patient Care Team:  Remi Barrientos MD as PCP - General (Internal Medicine)  Remi Barrientos MD as Assigned PCP    The following health maintenance items are reviewed in Epic and correct as of today:  Health Maintenance   Topic Date Due    RSV VACCINE (Pregnancy & 60+) (1 - 1-dose 60+ series) Never done    ZOSTER IMMUNIZATION (2 of 3) 11/24/2010    INFLUENZA VACCINE (1) 09/01/2023    COVID-19 Vaccine (3 - 2023-24 season) 09/01/2023    LIPID  02/22/2024    ANNUAL REVIEW OF HM ORDERS  02/22/2024    MEDICARE ANNUAL WELLNESS VISIT  02/22/2024    FALL RISK ASSESSMENT  02/23/2025    DTAP/TDAP/TD IMMUNIZATION (2 - Td or Tdap) 07/07/2025    GLUCOSE  02/22/2026    ADVANCE CARE PLANNING  02/22/2028    COLORECTAL CANCER SCREENING  03/13/2029    HEPATITIS C SCREENING  Completed    PHQ-2 (once per  "calendar year)  Completed    Pneumococcal Vaccine: 65+ Years  Completed    IPV IMMUNIZATION  Aged Out    HPV IMMUNIZATION  Aged Out    MENINGITIS IMMUNIZATION  Aged Out    RSV MONOCLONAL ANTIBODY  Aged Out         Review of Systems  Constitutional, HEENT, cardiovascular, pulmonary, gi and gu systems are negative, except as otherwise noted.     Objective    Exam  /72   Pulse 59   Temp 97.5  F (36.4  C)   Resp 14   Ht 1.765 m (5' 9.5\")   Wt 77.6 kg (171 lb)   SpO2 97%   BMI 24.89 kg/m     Estimated body mass index is 24.89 kg/m  as calculated from the following:    Height as of this encounter: 1.765 m (5' 9.5\").    Weight as of this encounter: 77.6 kg (171 lb).    Physical Exam  GENERAL: alert and no distress  EYES: Eyes grossly normal to inspection, PERRL and conjunctivae and sclerae normal  HENT: ear canals and TM's normal, nose and mouth without ulcers or lesions  NECK: no adenopathy, no asymmetry, masses, or scars  RESP: lungs clear to auscultation - no rales, rhonchi or wheezes  CV: regular rate and rhythm, normal S1 S2, no S3 or S4, no murmur, click or rub, no peripheral edema  ABDOMEN: soft, nontender, no hepatosplenomegaly, no masses and bowel sounds normal  MS: no gross musculoskeletal defects noted, no edema  SKIN: no suspicious lesions or rashes  NEURO: Normal strength and tone, mentation intact and speech normal  PSYCH: mentation appears normal, affect normal/bright        2/23/2024   Mini Cog   Clock Draw Score 2 Normal   3 Item Recall 3 objects recalled   Mini Cog Total Score 5            Signed Electronically by: YASMEEN Tabor CNP    "

## 2024-04-10 ENCOUNTER — TRANSFERRED RECORDS (OUTPATIENT)
Dept: HEALTH INFORMATION MANAGEMENT | Facility: CLINIC | Age: 79
End: 2024-04-10
Payer: COMMERCIAL

## 2024-07-11 ENCOUNTER — OFFICE VISIT (OUTPATIENT)
Dept: FAMILY MEDICINE | Facility: CLINIC | Age: 79
End: 2024-07-11
Payer: COMMERCIAL

## 2024-07-11 VITALS
HEIGHT: 70 IN | OXYGEN SATURATION: 96 % | DIASTOLIC BLOOD PRESSURE: 72 MMHG | HEART RATE: 52 BPM | SYSTOLIC BLOOD PRESSURE: 140 MMHG | BODY MASS INDEX: 24.17 KG/M2 | TEMPERATURE: 98 F | WEIGHT: 168.8 LBS | RESPIRATION RATE: 14 BRPM

## 2024-07-11 DIAGNOSIS — S39.012A STRAIN OF LUMBAR REGION, INITIAL ENCOUNTER: Primary | ICD-10-CM

## 2024-07-11 PROCEDURE — 99213 OFFICE O/P EST LOW 20 MIN: CPT | Performed by: FAMILY MEDICINE

## 2024-07-11 RX ORDER — DICLOFENAC POTASSIUM 50 MG/1
50 TABLET, FILM COATED ORAL 2 TIMES DAILY PRN
Qty: 40 TABLET | Refills: 0 | Status: SHIPPED | OUTPATIENT
Start: 2024-07-11

## 2024-07-11 RX ORDER — HYDROCORTISONE 2.5 %
CREAM (GRAM) TOPICAL 2 TIMES DAILY
COMMUNITY
Start: 2024-07-11

## 2024-07-11 ASSESSMENT — ENCOUNTER SYMPTOMS
HIP PAIN: 1
BACK PAIN: 1

## 2024-07-11 ASSESSMENT — PAIN SCALES - GENERAL: PAINLEVEL: MODERATE PAIN (5)

## 2024-07-11 NOTE — PROGRESS NOTES
Assessment & Plan     Strain of lumbar region, initial encounter  RICE therapy  Stay active  Home daily stretches and exercises.  - hydrocortisone 2.5 % cream; Apply topically 2 times daily  - diclofenac (CATAFLAM) 50 MG tablet; Take 1 tablet (50 mg) by mouth 2 times daily as needed for moderate pain      Subjective   Gasper is a 79 year old, presenting for the following health issues:  Back Pain and Hip Pain    Stiffness in the right lower lumbar region started a week ago.  No injury, no trauma, no falls.  Patient physically, active he does play Golf.    No lower extremities weakness, no loss of strength or sensations.        7/11/2024     1:16 PM   Additional Questions   Roomed by nannette gomes ma     History of Present Illness       Back Pain:  He presents for follow up of back pain. Patient's back pain is a new problem.    Original cause of back pain: lifting  First noticed back pain: in the last week  Patient feels back pain: constantlyLocation of back pain:  Right lower back  Description of back pain: dull ache  Back pain spreads: right buttocks    Since patient first noticed back pain, pain is: always present, but gets better and worse  Does back pain interfere with his job:  Not applicable  On a scale of 1-10 (10 being the worst), patient describes pain as:  5  What makes back pain worse: twisting   Acupuncture: not tried  Acetaminophen: helpful  Activity or exercise: not tried  Chiropractor:  Not tried  Cold: helpful  Heat: helpful  Massage: not tried  Muscle relaxants: not tried  NSAIDS: helpful  Opioids: not tried  Physical Therapy: not tried  Rest: helpful  Steroid Injection: not tried  Stretching: not tried  Surgery: not tried  TENS unit: not tried  Topical pain relievers: not tried  Other healthcare providers patient is seeing for back pain: None    He eats 0-1 servings of fruits and vegetables daily.He consumes 1 sweetened beverage(s) daily.He exercises with enough effort to increase his heart rate 30  "to 60 minutes per day.  He exercises with enough effort to increase his heart rate 5 days per week.   He is taking medications regularly.         Review of Systems  Constitutional, HEENT, cardiovascular, pulmonary, GI, , musculoskeletal, neuro, skin, endocrine and psych systems are negative, except as otherwise noted.      Objective    BP (!) 152/72 (BP Location: Right arm, Patient Position: Chair, Cuff Size: Adult Regular)   Pulse 52   Temp 98  F (36.7  C) (Oral)   Resp 14   Ht 1.765 m (5' 9.5\")   Wt 76.6 kg (168 lb 12.8 oz)   SpO2 96%   BMI 24.57 kg/m    Body mass index is 24.57 kg/m .  Physical Exam   GENERAL: alert and no distress  ABDOMEN: soft, nontender, no hepatosplenomegaly, no masses and bowel sounds normal  MS: no gross musculoskeletal defects noted, no edema  BACK: no CVA tenderness, no paralumbar tenderness  Comprehensive back pain exam:  Tenderness of mild tenderness right lower lumbar, Range of motion not limited by pain, Lower extremity strength functional and equal on both sides, Lower extremity reflexes within normal limits bilaterally, Lower extremity sensation normal and equal on both sides, and Straight leg raise negative bilaterally        Signed Electronically by: Morgan Lock MD    "

## 2025-02-28 PROBLEM — D12.6 BENIGN NEOPLASM OF COLON: Status: RESOLVED | Noted: 2022-06-01 | Resolved: 2025-02-28

## 2025-02-28 PROBLEM — C44.91 BASAL CELL CARCINOMA OF SKIN: Status: RESOLVED | Noted: 2022-06-01 | Resolved: 2025-02-28

## 2025-04-01 ENCOUNTER — TRANSFERRED RECORDS (OUTPATIENT)
Dept: HEALTH INFORMATION MANAGEMENT | Facility: CLINIC | Age: 80
End: 2025-04-01
Payer: COMMERCIAL

## 2025-04-06 DIAGNOSIS — I10 ESSENTIAL HYPERTENSION, BENIGN: ICD-10-CM

## 2025-04-07 RX ORDER — AMLODIPINE BESYLATE 5 MG/1
5 TABLET ORAL DAILY
Qty: 90 TABLET | Refills: 3 | Status: SHIPPED | OUTPATIENT
Start: 2025-04-07

## 2025-05-04 DIAGNOSIS — I10 ESSENTIAL HYPERTENSION, BENIGN: ICD-10-CM

## 2025-05-05 RX ORDER — LOSARTAN POTASSIUM 100 MG/1
100 TABLET ORAL
Qty: 90 TABLET | Refills: 3 | Status: SHIPPED | OUTPATIENT
Start: 2025-05-05

## 2025-06-04 ENCOUNTER — TRANSFERRED RECORDS (OUTPATIENT)
Dept: HEALTH INFORMATION MANAGEMENT | Facility: CLINIC | Age: 80
End: 2025-06-04
Payer: COMMERCIAL